# Patient Record
Sex: MALE | Race: WHITE | Employment: UNEMPLOYED | URBAN - METROPOLITAN AREA
[De-identification: names, ages, dates, MRNs, and addresses within clinical notes are randomized per-mention and may not be internally consistent; named-entity substitution may affect disease eponyms.]

---

## 2020-08-27 ENCOUNTER — APPOINTMENT (OUTPATIENT)
Dept: GENERAL RADIOLOGY | Age: 45
DRG: 812 | End: 2020-08-27
Payer: MEDICAID

## 2020-08-27 ENCOUNTER — HOSPITAL ENCOUNTER (INPATIENT)
Age: 45
LOS: 4 days | Discharge: HOME | DRG: 812 | End: 2020-08-31
Attending: EMERGENCY MEDICINE | Admitting: INTERNAL MEDICINE
Payer: MEDICAID

## 2020-08-27 PROBLEM — R09.02 HYPOXIA: Status: ACTIVE | Noted: 2020-08-27

## 2020-08-27 LAB
-: ABNORMAL
ABSOLUTE EOS #: 0.1 K/UL (ref 0–0.4)
ABSOLUTE IMMATURE GRANULOCYTE: ABNORMAL K/UL (ref 0–0.3)
ABSOLUTE LYMPH #: 1.9 K/UL (ref 1–4.8)
ABSOLUTE MONO #: 0.6 K/UL (ref 0.1–1.3)
ALBUMIN SERPL-MCNC: 4.4 G/DL (ref 3.5–5.2)
ALBUMIN/GLOBULIN RATIO: ABNORMAL (ref 1–2.5)
ALLEN TEST: ABNORMAL
ALP BLD-CCNC: 40 U/L (ref 40–129)
ALT SERPL-CCNC: 59 U/L (ref 5–41)
AMORPHOUS: ABNORMAL
ANION GAP SERPL CALCULATED.3IONS-SCNC: 13 MMOL/L (ref 9–17)
ANION GAP SERPL CALCULATED.3IONS-SCNC: 15 MMOL/L (ref 9–17)
AST SERPL-CCNC: 48 U/L
BACTERIA: ABNORMAL
BASOPHILS # BLD: 1 % (ref 0–2)
BASOPHILS ABSOLUTE: 0.1 K/UL (ref 0–0.2)
BILIRUB SERPL-MCNC: 0.21 MG/DL (ref 0.3–1.2)
BILIRUBIN URINE: NEGATIVE
BUN BLDV-MCNC: 15 MG/DL (ref 6–20)
BUN BLDV-MCNC: 16 MG/DL (ref 6–20)
BUN/CREAT BLD: ABNORMAL (ref 9–20)
BUN/CREAT BLD: ABNORMAL (ref 9–20)
CALCIUM SERPL-MCNC: 8.4 MG/DL (ref 8.6–10.4)
CALCIUM SERPL-MCNC: 8.7 MG/DL (ref 8.6–10.4)
CARBOXYHEMOGLOBIN: 4.6 % (ref 0–5)
CASTS UA: ABNORMAL /LPF
CASTS UA: ABNORMAL /LPF
CHLORIDE BLD-SCNC: 105 MMOL/L (ref 98–107)
CHLORIDE BLD-SCNC: 110 MMOL/L (ref 98–107)
CO2: 18 MMOL/L (ref 20–31)
CO2: 18 MMOL/L (ref 20–31)
COLOR: YELLOW
COMMENT UA: ABNORMAL
CREAT SERPL-MCNC: 0.82 MG/DL (ref 0.7–1.2)
CREAT SERPL-MCNC: 1.27 MG/DL (ref 0.7–1.2)
CRYSTALS, UA: ABNORMAL /HPF
DIFFERENTIAL TYPE: ABNORMAL
EOSINOPHILS RELATIVE PERCENT: 2 % (ref 0–4)
EPITHELIAL CELLS UA: ABNORMAL /HPF
FIO2: ABNORMAL
GFR AFRICAN AMERICAN: >60 ML/MIN
GFR AFRICAN AMERICAN: >60 ML/MIN
GFR NON-AFRICAN AMERICAN: >60 ML/MIN
GFR NON-AFRICAN AMERICAN: >60 ML/MIN
GFR SERPL CREATININE-BSD FRML MDRD: ABNORMAL ML/MIN/{1.73_M2}
GLUCOSE BLD-MCNC: 137 MG/DL (ref 70–99)
GLUCOSE BLD-MCNC: 89 MG/DL (ref 70–99)
GLUCOSE URINE: NEGATIVE
HCO3 ARTERIAL: 20.8 MMOL/L (ref 22–26)
HCT VFR BLD CALC: 48.6 % (ref 41–53)
HEMOGLOBIN: 16.3 G/DL (ref 13.5–17.5)
IMMATURE GRANULOCYTES: ABNORMAL %
KETONES, URINE: NEGATIVE
LEUKOCYTE ESTERASE, URINE: NEGATIVE
LYMPHOCYTES # BLD: 25 % (ref 24–44)
MAGNESIUM: 2.4 MG/DL (ref 1.6–2.6)
MCH RBC QN AUTO: 35.7 PG (ref 26–34)
MCHC RBC AUTO-ENTMCNC: 33.6 G/DL (ref 31–37)
MCV RBC AUTO: 106.1 FL (ref 80–100)
METHEMOGLOBIN: ABNORMAL % (ref 0–1.9)
MODE: ABNORMAL
MONOCYTES # BLD: 8 % (ref 1–7)
MUCUS: ABNORMAL
NEGATIVE BASE EXCESS, ART: 7.4 MMOL/L (ref 0–2)
NITRITE, URINE: NEGATIVE
NOTIFICATION TIME: ABNORMAL
NOTIFICATION: ABNORMAL
NRBC AUTOMATED: ABNORMAL PER 100 WBC
O2 DEVICE/FLOW/%: ABNORMAL
O2 SAT, ARTERIAL: 88.6 % (ref 95–98)
OTHER OBSERVATIONS UA: ABNORMAL
OXYHEMOGLOBIN: ABNORMAL % (ref 95–98)
PATIENT TEMP: 37
PCO2 ARTERIAL: 54.5 MMHG (ref 35–45)
PCO2, ART, TEMP ADJ: ABNORMAL (ref 35–45)
PDW BLD-RTO: 14.3 % (ref 11.5–14.9)
PEEP/CPAP: ABNORMAL
PH ARTERIAL: 7.19 (ref 7.35–7.45)
PH UA: 5.5 (ref 5–8)
PH, ART, TEMP ADJ: ABNORMAL (ref 7.35–7.45)
PLATELET # BLD: 298 K/UL (ref 150–450)
PLATELET ESTIMATE: ABNORMAL
PMV BLD AUTO: 7.4 FL (ref 6–12)
PO2 ARTERIAL: 73.1 MMHG (ref 80–100)
PO2, ART, TEMP ADJ: ABNORMAL MMHG (ref 80–100)
POSITIVE BASE EXCESS, ART: ABNORMAL MMOL/L (ref 0–2)
POTASSIUM SERPL-SCNC: 3.5 MMOL/L (ref 3.7–5.3)
POTASSIUM SERPL-SCNC: 4.8 MMOL/L (ref 3.7–5.3)
PROTEIN UA: ABNORMAL
PSV: ABNORMAL
PT. POSITION: ABNORMAL
RBC # BLD: 4.58 M/UL (ref 4.5–5.9)
RBC # BLD: ABNORMAL 10*6/UL
RBC UA: ABNORMAL /HPF
RENAL EPITHELIAL, UA: ABNORMAL /HPF
RESPIRATORY RATE: ABNORMAL
SAMPLE SITE: ABNORMAL
SEG NEUTROPHILS: 64 % (ref 36–66)
SEGMENTED NEUTROPHILS ABSOLUTE COUNT: 4.6 K/UL (ref 1.3–9.1)
SET RATE: ABNORMAL
SODIUM BLD-SCNC: 138 MMOL/L (ref 135–144)
SODIUM BLD-SCNC: 141 MMOL/L (ref 135–144)
SPECIFIC GRAVITY UA: 1.01 (ref 1–1.03)
TEXT FOR RESPIRATORY: ABNORMAL
TOTAL HB: ABNORMAL G/DL (ref 12–16)
TOTAL PROTEIN: 7.3 G/DL (ref 6.4–8.3)
TOTAL RATE: ABNORMAL
TRICHOMONAS: ABNORMAL
TROPONIN INTERP: ABNORMAL
TROPONIN T: ABNORMAL NG/ML
TROPONIN, HIGH SENSITIVITY: 41 NG/L (ref 0–22)
TROPONIN, HIGH SENSITIVITY: 46 NG/L (ref 0–22)
TROPONIN, HIGH SENSITIVITY: 61 NG/L (ref 0–22)
TURBIDITY: ABNORMAL
URINE HGB: NEGATIVE
UROBILINOGEN, URINE: NORMAL
VT: ABNORMAL
WBC # BLD: 7.3 K/UL (ref 3.5–11)
WBC # BLD: ABNORMAL 10*3/UL
WBC UA: ABNORMAL /HPF
YEAST: ABNORMAL

## 2020-08-27 PROCEDURE — 36600 WITHDRAWAL OF ARTERIAL BLOOD: CPT

## 2020-08-27 PROCEDURE — 82805 BLOOD GASES W/O2 SATURATION: CPT

## 2020-08-27 PROCEDURE — 99285 EMERGENCY DEPT VISIT HI MDM: CPT

## 2020-08-27 PROCEDURE — 2580000003 HC RX 258: Performed by: INTERNAL MEDICINE

## 2020-08-27 PROCEDURE — 81001 URINALYSIS AUTO W/SCOPE: CPT

## 2020-08-27 PROCEDURE — 80048 BASIC METABOLIC PNL TOTAL CA: CPT

## 2020-08-27 PROCEDURE — 93005 ELECTROCARDIOGRAM TRACING: CPT | Performed by: EMERGENCY MEDICINE

## 2020-08-27 PROCEDURE — 96376 TX/PRO/DX INJ SAME DRUG ADON: CPT

## 2020-08-27 PROCEDURE — 80053 COMPREHEN METABOLIC PANEL: CPT

## 2020-08-27 PROCEDURE — 94770 HC ETCO2 MONITOR DAILY: CPT

## 2020-08-27 PROCEDURE — 80307 DRUG TEST PRSMV CHEM ANLYZR: CPT

## 2020-08-27 PROCEDURE — 2580000003 HC RX 258: Performed by: EMERGENCY MEDICINE

## 2020-08-27 PROCEDURE — 36415 COLL VENOUS BLD VENIPUNCTURE: CPT

## 2020-08-27 PROCEDURE — 83735 ASSAY OF MAGNESIUM: CPT

## 2020-08-27 PROCEDURE — 94761 N-INVAS EAR/PLS OXIMETRY MLT: CPT

## 2020-08-27 PROCEDURE — 2700000000 HC OXYGEN THERAPY PER DAY

## 2020-08-27 PROCEDURE — 71045 X-RAY EXAM CHEST 1 VIEW: CPT

## 2020-08-27 PROCEDURE — 84484 ASSAY OF TROPONIN QUANT: CPT

## 2020-08-27 PROCEDURE — 96374 THER/PROPH/DIAG INJ IV PUSH: CPT

## 2020-08-27 PROCEDURE — 94660 CPAP INITIATION&MGMT: CPT

## 2020-08-27 PROCEDURE — 6360000002 HC RX W HCPCS: Performed by: EMERGENCY MEDICINE

## 2020-08-27 PROCEDURE — 2000000000 HC ICU R&B

## 2020-08-27 PROCEDURE — 85025 COMPLETE CBC W/AUTO DIFF WBC: CPT

## 2020-08-27 RX ORDER — LORAZEPAM 2 MG/ML
2 INJECTION INTRAMUSCULAR
Status: DISCONTINUED | OUTPATIENT
Start: 2020-08-27 | End: 2020-08-28

## 2020-08-27 RX ORDER — SODIUM CHLORIDE 0.9 % (FLUSH) 0.9 %
10 SYRINGE (ML) INJECTION PRN
Status: DISCONTINUED | OUTPATIENT
Start: 2020-08-27 | End: 2020-08-31 | Stop reason: HOSPADM

## 2020-08-27 RX ORDER — SODIUM CHLORIDE 9 MG/ML
INJECTION, SOLUTION INTRAVENOUS CONTINUOUS
Status: DISCONTINUED | OUTPATIENT
Start: 2020-08-27 | End: 2020-08-29

## 2020-08-27 RX ORDER — ACETAMINOPHEN 325 MG/1
650 TABLET ORAL EVERY 4 HOURS PRN
Status: DISCONTINUED | OUTPATIENT
Start: 2020-08-27 | End: 2020-08-31 | Stop reason: HOSPADM

## 2020-08-27 RX ORDER — LORAZEPAM 2 MG/ML
4 INJECTION INTRAMUSCULAR
Status: DISCONTINUED | OUTPATIENT
Start: 2020-08-27 | End: 2020-08-28

## 2020-08-27 RX ORDER — LORAZEPAM 1 MG/1
3 TABLET ORAL
Status: DISCONTINUED | OUTPATIENT
Start: 2020-08-27 | End: 2020-08-28

## 2020-08-27 RX ORDER — NALOXONE HYDROCHLORIDE 1 MG/ML
1 INJECTION INTRAMUSCULAR; INTRAVENOUS; SUBCUTANEOUS ONCE
Status: COMPLETED | OUTPATIENT
Start: 2020-08-27 | End: 2020-08-27

## 2020-08-27 RX ORDER — SODIUM CHLORIDE 0.9 % (FLUSH) 0.9 %
10 SYRINGE (ML) INJECTION EVERY 12 HOURS SCHEDULED
Status: DISCONTINUED | OUTPATIENT
Start: 2020-08-27 | End: 2020-08-31 | Stop reason: HOSPADM

## 2020-08-27 RX ORDER — SODIUM CHLORIDE 0.9 % (FLUSH) 0.9 %
10 SYRINGE (ML) INJECTION PRN
Status: DISCONTINUED | OUTPATIENT
Start: 2020-08-27 | End: 2020-08-28 | Stop reason: SDUPTHER

## 2020-08-27 RX ORDER — NALOXONE HYDROCHLORIDE 1 MG/ML
2 INJECTION INTRAMUSCULAR; INTRAVENOUS; SUBCUTANEOUS ONCE
Status: COMPLETED | OUTPATIENT
Start: 2020-08-27 | End: 2020-08-27

## 2020-08-27 RX ORDER — LORAZEPAM 2 MG/ML
1 INJECTION INTRAMUSCULAR
Status: DISCONTINUED | OUTPATIENT
Start: 2020-08-27 | End: 2020-08-28

## 2020-08-27 RX ORDER — LORAZEPAM 1 MG/1
2 TABLET ORAL
Status: DISCONTINUED | OUTPATIENT
Start: 2020-08-27 | End: 2020-08-28

## 2020-08-27 RX ORDER — LORAZEPAM 1 MG/1
1 TABLET ORAL
Status: DISCONTINUED | OUTPATIENT
Start: 2020-08-27 | End: 2020-08-28

## 2020-08-27 RX ORDER — LORAZEPAM 1 MG/1
4 TABLET ORAL
Status: DISCONTINUED | OUTPATIENT
Start: 2020-08-27 | End: 2020-08-28

## 2020-08-27 RX ORDER — LORAZEPAM 2 MG/ML
3 INJECTION INTRAMUSCULAR
Status: DISCONTINUED | OUTPATIENT
Start: 2020-08-27 | End: 2020-08-28

## 2020-08-27 RX ORDER — SODIUM CHLORIDE 0.9 % (FLUSH) 0.9 %
10 SYRINGE (ML) INJECTION EVERY 12 HOURS SCHEDULED
Status: DISCONTINUED | OUTPATIENT
Start: 2020-08-27 | End: 2020-08-28 | Stop reason: SDUPTHER

## 2020-08-27 RX ORDER — 0.9 % SODIUM CHLORIDE 0.9 %
1000 INTRAVENOUS SOLUTION INTRAVENOUS ONCE
Status: COMPLETED | OUTPATIENT
Start: 2020-08-27 | End: 2020-08-27

## 2020-08-27 RX ADMIN — SODIUM CHLORIDE 1000 ML: 9 INJECTION, SOLUTION INTRAVENOUS at 13:20

## 2020-08-27 RX ADMIN — SODIUM CHLORIDE: 9 INJECTION, SOLUTION INTRAVENOUS at 22:30

## 2020-08-27 RX ADMIN — NALOXONE HYDROCHLORIDE 2 MG: 1 INJECTION PARENTERAL at 13:20

## 2020-08-27 RX ADMIN — NALOXONE HYDROCHLORIDE 1 MG: 1 INJECTION PARENTERAL at 15:19

## 2020-08-27 ASSESSMENT — ENCOUNTER SYMPTOMS
BACK PAIN: 0
INGESTION: 1
COLOR CHANGE: 0
SHORTNESS OF BREATH: 0
ABDOMINAL PAIN: 0
EYE PAIN: 0

## 2020-08-27 NOTE — ED NOTES
Bed: 07A  Expected date:   Expected time:   Means of arrival:   Comments:     Radha Regalado, IVONNE  08/27/20 6569

## 2020-08-27 NOTE — ED TRIAGE NOTES
Pt presents in ED via EMS per squad the pt was found unresponsive next to an individual who was in cardiac arrest; EMS stated that the pt admitted to ingesting an unknown amount of heroin; unknown route; pt was given the following by squad:    6 mg of Narcan IV  2 mg of Narcan IN  4 mg of Zofran      Upon arrival to ED; pt is alert and oriented to self; situation and location; pt is diaphoretic; pt denies medical concerns; pt refused to admit to heroin use; pt stated that this is out of his normal character; pt is apologetic; placed on Cardiac monitor.

## 2020-08-27 NOTE — PROGRESS NOTES
Medication History completed:    Patient denies taking any prescription medications or OTC medications. Thank you,  Maya Butler PharmD.  Candidate 2021

## 2020-08-28 PROBLEM — N17.9 AKI (ACUTE KIDNEY INJURY) (HCC): Status: ACTIVE | Noted: 2020-08-28

## 2020-08-28 PROBLEM — I24.9 ACS (ACUTE CORONARY SYNDROME) (HCC): Status: ACTIVE | Noted: 2020-08-28

## 2020-08-28 PROBLEM — F19.10 SUBSTANCE ABUSE (HCC): Status: ACTIVE | Noted: 2020-08-28

## 2020-08-28 PROBLEM — I10 HYPERTENSION: Status: ACTIVE | Noted: 2020-08-28

## 2020-08-28 LAB
ABSOLUTE EOS #: 0 K/UL (ref 0–0.4)
ABSOLUTE IMMATURE GRANULOCYTE: ABNORMAL K/UL (ref 0–0.3)
ABSOLUTE LYMPH #: 1.6 K/UL (ref 1–4.8)
ABSOLUTE MONO #: 1.2 K/UL (ref 0.1–1.3)
ALLEN TEST: ABNORMAL
AMPHETAMINE SCREEN URINE: NEGATIVE
ANION GAP SERPL CALCULATED.3IONS-SCNC: 11 MMOL/L (ref 9–17)
BARBITURATE SCREEN URINE: NEGATIVE
BASOPHILS # BLD: 1 % (ref 0–2)
BASOPHILS ABSOLUTE: 0 K/UL (ref 0–0.2)
BENZODIAZEPINE SCREEN, URINE: NEGATIVE
BNP INTERPRETATION: NORMAL
BUN BLDV-MCNC: 17 MG/DL (ref 6–20)
BUN/CREAT BLD: ABNORMAL (ref 9–20)
BUPRENORPHINE URINE: NORMAL
C-REACTIVE PROTEIN: 14.9 MG/L (ref 0–5)
CALCIUM SERPL-MCNC: 8.6 MG/DL (ref 8.6–10.4)
CANNABINOID SCREEN URINE: NEGATIVE
CARBOXYHEMOGLOBIN: 1.7 % (ref 0–5)
CARBOXYHEMOGLOBIN: 2 % (ref 0–5)
CARBOXYHEMOGLOBIN: 3.1 % (ref 0–5)
CHLORIDE BLD-SCNC: 108 MMOL/L (ref 98–107)
CO2: 22 MMOL/L (ref 20–31)
COCAINE METABOLITE, URINE: NEGATIVE
CREAT SERPL-MCNC: 0.82 MG/DL (ref 0.7–1.2)
D-DIMER QUANTITATIVE: 0.38 MG/L FEU (ref 0–0.59)
DIFFERENTIAL TYPE: ABNORMAL
EKG ATRIAL RATE: 122 BPM
EKG P AXIS: 67 DEGREES
EKG P-R INTERVAL: 150 MS
EKG Q-T INTERVAL: 308 MS
EKG QRS DURATION: 96 MS
EKG QTC CALCULATION (BAZETT): 438 MS
EKG R AXIS: 31 DEGREES
EKG T AXIS: 145 DEGREES
EKG VENTRICULAR RATE: 122 BPM
EOSINOPHILS RELATIVE PERCENT: 0 % (ref 0–4)
FERRITIN: 161 UG/L (ref 30–400)
FIO2: 30
FIO2: ABNORMAL
FIO2: ABNORMAL
GFR AFRICAN AMERICAN: >60 ML/MIN
GFR NON-AFRICAN AMERICAN: >60 ML/MIN
GFR SERPL CREATININE-BSD FRML MDRD: ABNORMAL ML/MIN/{1.73_M2}
GFR SERPL CREATININE-BSD FRML MDRD: ABNORMAL ML/MIN/{1.73_M2}
GLUCOSE BLD-MCNC: 83 MG/DL (ref 70–99)
HCO3 ARTERIAL: 19.9 MMOL/L (ref 22–26)
HCO3 ARTERIAL: 21.2 MMOL/L (ref 22–26)
HCO3 ARTERIAL: 22.9 MMOL/L (ref 22–26)
HCT VFR BLD CALC: 44.8 % (ref 41–53)
HEMOGLOBIN: 15 G/DL (ref 13.5–17.5)
IMMATURE GRANULOCYTES: ABNORMAL %
LACTATE DEHYDROGENASE: 173 U/L (ref 135–225)
LV EF: 53 %
LVEF MODALITY: NORMAL
LYMPHOCYTES # BLD: 19 % (ref 24–44)
MCH RBC QN AUTO: 35.8 PG (ref 26–34)
MCHC RBC AUTO-ENTMCNC: 33.5 G/DL (ref 31–37)
MCV RBC AUTO: 106.9 FL (ref 80–100)
MDMA URINE: NORMAL
METHADONE SCREEN, URINE: NEGATIVE
METHAMPHETAMINE, URINE: NORMAL
METHEMOGLOBIN: 0.8 % (ref 0–1.9)
METHEMOGLOBIN: 0.8 % (ref 0–1.9)
METHEMOGLOBIN: 1 % (ref 0–1.9)
MODE: ABNORMAL
MONOCYTES # BLD: 15 % (ref 1–7)
MYOGLOBIN: 82 NG/ML (ref 28–72)
NEGATIVE BASE EXCESS, ART: 3.8 MMOL/L (ref 0–2)
NEGATIVE BASE EXCESS, ART: 6 MMOL/L (ref 0–2)
NEGATIVE BASE EXCESS, ART: 7.5 MMOL/L (ref 0–2)
NOTIFICATION TIME: ABNORMAL
NOTIFICATION: ABNORMAL
NRBC AUTOMATED: ABNORMAL PER 100 WBC
O2 DEVICE/FLOW/%: ABNORMAL
O2 SAT, ARTERIAL: 94 % (ref 95–98)
O2 SAT, ARTERIAL: 95 % (ref 95–98)
O2 SAT, ARTERIAL: 95.1 % (ref 95–98)
OPIATES, URINE: NEGATIVE
OXYCODONE SCREEN URINE: NEGATIVE
OXYHEMOGLOBIN: ABNORMAL % (ref 95–98)
PATIENT TEMP: 37
PCO2 ARTERIAL: 46.9 MMHG (ref 35–45)
PCO2 ARTERIAL: 48.3 MMHG (ref 35–45)
PCO2 ARTERIAL: 48.6 MMHG (ref 35–45)
PCO2, ART, TEMP ADJ: ABNORMAL (ref 35–45)
PDW BLD-RTO: 14.2 % (ref 11.5–14.9)
PEEP/CPAP: ABNORMAL
PH ARTERIAL: 7.24 (ref 7.35–7.45)
PH ARTERIAL: 7.25 (ref 7.35–7.45)
PH ARTERIAL: 7.29 (ref 7.35–7.45)
PH, ART, TEMP ADJ: ABNORMAL (ref 7.35–7.45)
PHENCYCLIDINE, URINE: NEGATIVE
PLATELET # BLD: 251 K/UL (ref 150–450)
PLATELET ESTIMATE: ABNORMAL
PMV BLD AUTO: 7.5 FL (ref 6–12)
PO2 ARTERIAL: 108 MMHG (ref 80–100)
PO2 ARTERIAL: 93.4 MMHG (ref 80–100)
PO2 ARTERIAL: 99 MMHG (ref 80–100)
PO2, ART, TEMP ADJ: ABNORMAL MMHG (ref 80–100)
POSITIVE BASE EXCESS, ART: ABNORMAL MMOL/L (ref 0–2)
POTASSIUM SERPL-SCNC: 4.8 MMOL/L (ref 3.7–5.3)
PRO-BNP: 56 PG/ML
PROPOXYPHENE, URINE: NORMAL
PSV: ABNORMAL
PT. POSITION: ABNORMAL
RBC # BLD: 4.19 M/UL (ref 4.5–5.9)
RBC # BLD: ABNORMAL 10*6/UL
RESPIRATORY RATE: 22
RESPIRATORY RATE: 23
RESPIRATORY RATE: 25
SAMPLE SITE: ABNORMAL
SEG NEUTROPHILS: 65 % (ref 36–66)
SEGMENTED NEUTROPHILS ABSOLUTE COUNT: 5.3 K/UL (ref 1.3–9.1)
SET RATE: ABNORMAL
SODIUM BLD-SCNC: 141 MMOL/L (ref 135–144)
TEST INFORMATION: NORMAL
TEXT FOR RESPIRATORY: ABNORMAL
TOTAL HB: ABNORMAL G/DL (ref 12–16)
TOTAL RATE: ABNORMAL
TRICYCLIC ANTIDEPRESSANTS, UR: NORMAL
TROPONIN INTERP: ABNORMAL
TROPONIN T: ABNORMAL NG/ML
TROPONIN, HIGH SENSITIVITY: 73 NG/L (ref 0–22)
VT: ABNORMAL
WBC # BLD: 8.1 K/UL (ref 3.5–11)
WBC # BLD: ABNORMAL 10*3/UL

## 2020-08-28 PROCEDURE — 82805 BLOOD GASES W/O2 SATURATION: CPT

## 2020-08-28 PROCEDURE — C8929 TTE W OR WO FOL WCON,DOPPLER: HCPCS

## 2020-08-28 PROCEDURE — 80048 BASIC METABOLIC PNL TOTAL CA: CPT

## 2020-08-28 PROCEDURE — 83615 LACTATE (LD) (LDH) ENZYME: CPT

## 2020-08-28 PROCEDURE — 82728 ASSAY OF FERRITIN: CPT

## 2020-08-28 PROCEDURE — 94761 N-INVAS EAR/PLS OXIMETRY MLT: CPT

## 2020-08-28 PROCEDURE — 6360000002 HC RX W HCPCS: Performed by: INTERNAL MEDICINE

## 2020-08-28 PROCEDURE — 85025 COMPLETE CBC W/AUTO DIFF WBC: CPT

## 2020-08-28 PROCEDURE — 2500000003 HC RX 250 WO HCPCS: Performed by: STUDENT IN AN ORGANIZED HEALTH CARE EDUCATION/TRAINING PROGRAM

## 2020-08-28 PROCEDURE — 6370000000 HC RX 637 (ALT 250 FOR IP): Performed by: INTERNAL MEDICINE

## 2020-08-28 PROCEDURE — 2700000000 HC OXYGEN THERAPY PER DAY

## 2020-08-28 PROCEDURE — 84484 ASSAY OF TROPONIN QUANT: CPT

## 2020-08-28 PROCEDURE — 85379 FIBRIN DEGRADATION QUANT: CPT

## 2020-08-28 PROCEDURE — 83874 ASSAY OF MYOGLOBIN: CPT

## 2020-08-28 PROCEDURE — 94640 AIRWAY INHALATION TREATMENT: CPT

## 2020-08-28 PROCEDURE — 6360000004 HC RX CONTRAST MEDICATION: Performed by: INTERNAL MEDICINE

## 2020-08-28 PROCEDURE — 36415 COLL VENOUS BLD VENIPUNCTURE: CPT

## 2020-08-28 PROCEDURE — 83880 ASSAY OF NATRIURETIC PEPTIDE: CPT

## 2020-08-28 PROCEDURE — 86140 C-REACTIVE PROTEIN: CPT

## 2020-08-28 PROCEDURE — 2000000000 HC ICU R&B

## 2020-08-28 PROCEDURE — 99291 CRITICAL CARE FIRST HOUR: CPT | Performed by: INTERNAL MEDICINE

## 2020-08-28 PROCEDURE — 2580000003 HC RX 258: Performed by: INTERNAL MEDICINE

## 2020-08-28 PROCEDURE — 6360000002 HC RX W HCPCS: Performed by: STUDENT IN AN ORGANIZED HEALTH CARE EDUCATION/TRAINING PROGRAM

## 2020-08-28 PROCEDURE — 6370000000 HC RX 637 (ALT 250 FOR IP): Performed by: STUDENT IN AN ORGANIZED HEALTH CARE EDUCATION/TRAINING PROGRAM

## 2020-08-28 PROCEDURE — 94660 CPAP INITIATION&MGMT: CPT

## 2020-08-28 PROCEDURE — 36600 WITHDRAWAL OF ARTERIAL BLOOD: CPT

## 2020-08-28 RX ORDER — ASPIRIN 81 MG/1
81 TABLET, CHEWABLE ORAL DAILY
Status: DISCONTINUED | OUTPATIENT
Start: 2020-08-28 | End: 2020-08-31 | Stop reason: HOSPADM

## 2020-08-28 RX ORDER — CARVEDILOL 6.25 MG/1
6.25 TABLET ORAL 2 TIMES DAILY WITH MEALS
Status: DISCONTINUED | OUTPATIENT
Start: 2020-08-28 | End: 2020-08-31 | Stop reason: HOSPADM

## 2020-08-28 RX ORDER — IPRATROPIUM BROMIDE AND ALBUTEROL SULFATE 2.5; .5 MG/3ML; MG/3ML
1 SOLUTION RESPIRATORY (INHALATION)
Status: DISCONTINUED | OUTPATIENT
Start: 2020-08-28 | End: 2020-08-29

## 2020-08-28 RX ORDER — METOPROLOL TARTRATE 5 MG/5ML
5 INJECTION INTRAVENOUS EVERY 6 HOURS PRN
Status: DISCONTINUED | OUTPATIENT
Start: 2020-08-28 | End: 2020-08-31 | Stop reason: HOSPADM

## 2020-08-28 RX ADMIN — CARVEDILOL 6.25 MG: 6.25 TABLET, FILM COATED ORAL at 11:35

## 2020-08-28 RX ADMIN — LORAZEPAM 1 MG: 2 INJECTION INTRAMUSCULAR; INTRAVENOUS at 01:30

## 2020-08-28 RX ADMIN — ASPIRIN 81 MG 81 MG: 81 TABLET ORAL at 11:45

## 2020-08-28 RX ADMIN — PERFLUTREN 2.2 MG: 6.52 INJECTION, SUSPENSION INTRAVENOUS at 11:09

## 2020-08-28 RX ADMIN — IPRATROPIUM BROMIDE AND ALBUTEROL SULFATE 1 AMPULE: .5; 3 SOLUTION RESPIRATORY (INHALATION) at 11:31

## 2020-08-28 RX ADMIN — IPRATROPIUM BROMIDE AND ALBUTEROL SULFATE 1 AMPULE: .5; 3 SOLUTION RESPIRATORY (INHALATION) at 18:54

## 2020-08-28 RX ADMIN — METOPROLOL TARTRATE 5 MG: 1 INJECTION, SOLUTION INTRAVENOUS at 13:14

## 2020-08-28 RX ADMIN — IPRATROPIUM BROMIDE AND ALBUTEROL SULFATE 1 AMPULE: .5; 3 SOLUTION RESPIRATORY (INHALATION) at 15:06

## 2020-08-28 RX ADMIN — ENOXAPARIN SODIUM 40 MG: 40 INJECTION SUBCUTANEOUS at 11:35

## 2020-08-28 RX ADMIN — SODIUM CHLORIDE: 9 INJECTION, SOLUTION INTRAVENOUS at 16:42

## 2020-08-28 RX ADMIN — LORAZEPAM 1 MG: 2 INJECTION INTRAMUSCULAR; INTRAVENOUS at 06:34

## 2020-08-28 RX ADMIN — ENOXAPARIN SODIUM 40 MG: 40 INJECTION SUBCUTANEOUS at 09:27

## 2020-08-28 RX ADMIN — ENOXAPARIN SODIUM 80 MG: 100 INJECTION SUBCUTANEOUS at 23:50

## 2020-08-28 RX ADMIN — CARVEDILOL 6.25 MG: 6.25 TABLET, FILM COATED ORAL at 16:09

## 2020-08-28 RX ADMIN — IPRATROPIUM BROMIDE AND ALBUTEROL SULFATE 1 AMPULE: .5; 3 SOLUTION RESPIRATORY (INHALATION) at 23:14

## 2020-08-28 ASSESSMENT — ENCOUNTER SYMPTOMS
SHORTNESS OF BREATH: 1
RESPIRATORY NEGATIVE: 1
DIARRHEA: 0
COUGH: 0
NAUSEA: 0
VOMITING: 0
GASTROINTESTINAL NEGATIVE: 1
EYES NEGATIVE: 1
ABDOMINAL PAIN: 0
WHEEZING: 0

## 2020-08-28 ASSESSMENT — PAIN SCALES - GENERAL
PAINLEVEL_OUTOF10: 0
PAINLEVEL_OUTOF10: 0

## 2020-08-28 NOTE — PLAN OF CARE
Problem: Falls - Risk of:  Goal: Will remain free from falls  Description: Will remain free from falls  8/28/2020 1612 by Thelma Simmonds, RN  Outcome: Ongoing  Note: Bed remains in lowest position, call light within reach. Patient remains free of falls at this time. RN will continue to monitor. 8/28/2020 0350 by Vishal Peraza RN  Outcome: Ongoing  Goal: Absence of physical injury  Description: Absence of physical injury  8/28/2020 1612 by Thelma Simmonds, RN  Outcome: Ongoing  8/28/2020 0350 by Vishal Peraza RN  Outcome: Ongoing     Problem: Breathing Pattern - Ineffective:  Goal: Ability to achieve and maintain a regular respiratory rate will improve  Description: Ability to achieve and maintain a regular respiratory rate will improve  8/28/2020 1612 by Thelma Simmonds, RN  Outcome: Ongoing  Note: Pt remains on bipap  8/28/2020 0350 by Vishal Peraza RN  Outcome: Ongoing  Note: Pt remains on continuous BiPAP to aid in resolution of acidosis. Tolerating well. Problem: Safety:  Goal: Free from accidental physical injury  Description: Free from accidental physical injury  8/28/2020 1612 by Thelma Simmonds, RN  Outcome: Ongoing  Note: Bed remains in lowest position, call light within reach. Patient remains free of falls at this time. RN will continue to monitor. 8/28/2020 0350 by Vishal Peraza RN  Outcome: Ongoing  Goal: Free from intentional harm  Description: Free from intentional harm  8/28/2020 1612 by Thelma Simmonds, RN  Outcome: Ongoing  8/28/2020 0350 by Vishal Peraza RN  Outcome: Ongoing     Problem: Daily Care:  Goal: Daily care needs are met  Description: Daily care needs are met  8/28/2020 1612 by Thelma Simmonds, RN  Outcome: Ongoing  Note: Daily care needs have been met this shift. Bathroom needs have been assessed every 2 hours. Hygiene care needs has been assessed this shift. Will continue to monitor.    8/28/2020 0350 by Vishal Peraza RN  Outcome: Ongoing     Problem: Pain:  Goal: Patient's pain/discomfort is manageable  Description: Patient's pain/discomfort is manageable  8/28/2020 1612 by Belia Gamble RN  Outcome: Ongoing  Note: Pain assessed at regular intervals. Medications administered as requested for comfort. Pain remains at a tolerable level. 8/28/2020 0350 by Marizol Coyne RN  Outcome: Ongoing     Problem: Discharge Planning:  Goal: Patients continuum of care needs are met  Description: Patients continuum of care needs are met  8/28/2020 1612 by Belia Gamble RN  Outcome: Ongoing  8/28/2020 0350 by Marizol Coyne RN  Outcome: Ongoing     Problem: Gas Exchange - Impaired:  Goal: Levels of oxygenation will improve  Description: Levels of oxygenation will improve  8/28/2020 1612 by Belia Gamble RN  Outcome: Ongoing  8/28/2020 0350 by Marizol Coyne RN  Outcome: Ongoing  Note: Pts O2 sats have been in the 90s this shift with continous BiPAP and pt has been more alert. Will continue to monitor. Problem: Skin Integrity:  Goal: Will show no infection signs and symptoms  Description: Will show no infection signs and symptoms  Outcome: Ongoing  Note: Patient turned and repositioned every 2 hours and as needed for comfort. Skin remains dry and intact. No new skin breakdown noted.    Goal: Absence of new skin breakdown  Description: Absence of new skin breakdown  Outcome: Ongoing

## 2020-08-28 NOTE — FLOWSHEET NOTE
08/27/20 2229   Provider Notification   Reason for Communication Review case   Provider Name Dr. Mars Perez   Provider Notification Physician   Method of Communication Call   Response See orders   Notification Time 65   Dr. Mars Perez called RN asking about an update. After RN updated doctor she ordered a stat BMP and to update her with the results. Orders initiated.

## 2020-08-28 NOTE — PROGRESS NOTES
Patient transferred back to ICU 2001 report given to Jennifer CORONADO at bedside, RN updated to call  for other gases after bipap  Electronically signed by Steve Templeton RN on 8/27/2020 at 9:56 PM

## 2020-08-28 NOTE — FLOWSHEET NOTE
08/27/20 2300   Provider Notification   Reason for Communication Evaluate   Provider Name Dr. Abimael Garsia   Provider Notification Physician   Method of Communication Call   Response No new orders   Notification Time [phone]   RN updated Dr. Abimael Garsia on [facility] results. No new orders at this time.

## 2020-08-28 NOTE — PROGRESS NOTES
Per Dr. Shannan Castellanos once the urine drug tox has been obtained. Please contact Poison control to see what further management is required.  Electronically signed by Allison Chaudhary RN on 8/27/2020 at 10:58 PM

## 2020-08-28 NOTE — PROGRESS NOTES
Pt transferred to ICU 2001 from PCU. Heart monitor applied, pulse ox applied, and pt assessed. Respiratory notified of BiPAP order, currently being placed on pt. RN will call Hugo Starr for any repeat labs. Pt having no c/o or s/s of distress at this time, will continue to monitor.

## 2020-08-28 NOTE — CONSULTS
[facility]  Consult             Date:   2020  Patient name: Kamilla Wheeler  Date of admission:  2020 12:44 PM  MRN:   [id number]  YOB: 1975    Reason for Consult:  Elevated troponin    CHIEF COMPLAINT:  Drug overdose     History Obtained From:  Patient, EMR    HISTORY OF PRESENT ILLNESS:      Brief History:  Patient was brought to the ED secondary to drug overdose. He was found unconscious, became arousable after Narcan administration. Patient had used a white substance prior, claims it may have been heroin but was not certain. Patient states he got this substance from a neighbor, does not know what it is. Urine drug screen was negative for all substances. Patient was in respiratory failure in the ED, was started on BiPAP. Patient had elevated troponin, history of hypertension, not on medication. Patient drinks 4-5 beers daily. Chest pain: No        Past Medical History:   has a past medical history of Overdose. Past Surgical History:   has no past surgical history on file. Family History:   Positive for early CAD, father  of MI at 54    Home Medications:    Prior to Admission medications    Not on File       Allergies:  Patient has no known allergies. Social History:      1PPD tobacco  Heroin, cocaine use in the past  4-5 beers daily    REVIEW OF SYSTEMS:      Review of Systems   Constitutional: Negative for chills and fever. Respiratory: Positive for shortness of breath. Negative for cough and wheezing. Cardiovascular: Negative for chest pain, palpitations and leg swelling. Gastrointestinal: Negative for abdominal pain, diarrhea, nausea and vomiting. Neurological: Negative for dizziness and headaches.          PHYSICAL EXAM:    Physical Examination:    BP (!) 151/104   Pulse 89   Temp 97.5 °F (36.4 °C) (Axillary)   Resp 18   Ht 5' 6\" (1.676 m)   Wt 185 lb (83.9 kg)   SpO2 94%   BMI 29.86 kg/m²      Physical Exam  Vitals signs and nursing note reviewed. Constitutional:       Appearance: Normal appearance. Comments: Patient on BiPAP, breathing comfortably   HENT:      Head: Normocephalic and atraumatic. Eyes:      Extraocular Movements: Extraocular movements intact. Conjunctiva/sclera: Conjunctivae normal.   Cardiovascular:      Rate and Rhythm: Normal rate and regular rhythm. Pulses: Normal pulses. Heart sounds: Normal heart sounds. No murmur. No friction rub. No gallop. Pulmonary:      Effort: Pulmonary effort is normal. No respiratory distress. Breath sounds: Rhonchi present. No wheezing or rales. Abdominal:      General: Bowel sounds are normal. There is no distension. Palpations: Abdomen is soft. Tenderness: There is no abdominal tenderness. There is no guarding. Musculoskeletal:      Right lower leg: No edema. Left lower leg: No edema. Neurological:      General: No focal deficit present. Mental Status: He is alert. DATA:    Diagnostics:      EKG: Sinus tachycardia    Labs:     CBC:   Recent Labs     08/27/20  1315 08/28/20  0334   WBC 7.3 8.1   HGB 16.3 15.0   HCT 48.6 44.8    251     BMP:   Recent Labs     08/27/20  2240 08/28/20  0334    141   K 4.8 4.8   CO2 18* 22   BUN 16 17   CREATININE 0.82 0.82   LABGLOM >60 >60   GLUCOSE 89 83     BNP: No results for input(s): BNP in the last 72 hours. PT/INR: No results for input(s): PROTIME, INR in the last 72 hours. APTT:No results for input(s): APTT in the last 72 hours. CARDIAC ENZYMES:No results for input(s): CKTOTAL, CKMB, CKMBINDEX, TROPONINI in the last 72 hours. FASTING LIPID PANEL:No results found for: HDL, LDLDIRECT, LDLCALC, TRIG  LIVER PROFILE:  Recent Labs     08/27/20  1315   AST 48*   ALT 59*   LABALBU 4.4         IMPRESSION:    1. Possible NSTEMI  2. Hypertension  3. Sinus tachycardia  4. Acute respiratory failure  5. Drug overdose  6.  Alcoholism    Patient Active Problem List   Diagnosis    Hypoxia       RECOMMENDATIONS:  1. Echo  2. Start Coreg 6.25 mg twice daily  3. ASA 81mg daily  4. Lopressor 5 mg PRN for SBP >160 or DBP > 100   5. Cardiac cath Monday morning   6. n.p.o. Monday midnight  7. Full dose Lovenox 1 mg/kg  8. Patient was given prophylactic dose of Lovenox 40 mg this morning, will give an additional 40 mg one-time today    Discussed with patient and nursing. Discussed with Dr. Leydi Lopez on rounds. Electronically signed by Elkin Winters MD on 8/28/2020 at 9:12 AM.  PGY2 Family Medicine Resident\    I performed a history and physical examination of the patient and discussed management with the resident. I reviewed the residents note and agree with the documented findings and plan of care. Any areas of disagreement are noted on the chart. I was personally present for the key portions of any procedures. I have documented in the chart those procedures where I was not present during the key portions. I have personally evaluated this patient and have completed at least one if not all key elements of the E/M (history, physical exam, and MDM). Additional findings are as noted. Rising trop worrisome for NSTEMI. No ECG changes. On BIPAP. Utox negative, ?synthetic drug use. Chronic smoker and also h/o HTN not taking meds. Also significant alcohol use. ASA, full dose lovenox. Coreg. Alcohol withdrawal precautions. Per pulmonary, wait on cath due to pulmonary status. Plan for cath on Monday. Obtain TTE. [facility] cardiology Consult  .

## 2020-08-28 NOTE — CARE COORDINATION
CASE MANAGEMENT NOTE:    Admission Date:  8/27/2020 Yamini Addison is a 54 y.o.  male    Admitted for : Hypoxia [R09.02]    Writer reviewed chart. Unable to speak with patient as he is on bipap continuously. PCP:  None listed. Insurance:  Pending Medicaid      Current Residence/ Living Arrangements:  independently at home             Current Services PTA:  None listed    Is patient agreeable to VNS: unsure, will have to speak with patient    Freedom of choice provided:  NA    List of [address] provided: NA    VNS chosen:  NA    DME:  none    Home Oxygen: No    Nebulizer: No    CPAP/BIPAP: No    Supplier: N/A    Potential Assistance Needed: to be determined. SNF needed: No    Freedom of choice and list provided: NA    Pharmacy:  None listed       Does Patient want to use MEDS to BEDS? Will have to ask patient       Discharge Plan: To be determined. Pt having cardiac cath on Monday. Drug overdose by snorting heroin.                  Electronically signed by: Patricia Lugo RN on 8/28/2020 at 11:20 AM

## 2020-08-28 NOTE — FLOWSHEET NOTE
08/27/20 2221   Provider Notification   Reason for Communication Evaluate   Provider Name Dr. Max Mcdowell   Provider Notification Physician   Method of Communication Call   Response See orders   Notification Time [phone]   RN called Dr. Max Mcdowell updating him on pt condition. He ordered repeat ABGs in 1 hour. Orders initiated.

## 2020-08-28 NOTE — FLOWSHEET NOTE
08/28/20 0554   Provider Notification   Reason for Communication Critical Value (comment)  (abg)   Provider Name Dr. Alejandrina Giordano   Provider Notification Physician   Method of Communication Call   Response No new orders   Notification Time [phone]   RN paged Dr. Alejandrina Giordano regarding latest ABG results. No new orders at this time.

## 2020-08-28 NOTE — PROGRESS NOTES
RN spoke with  regarding new consult, RN informed physician of pts ABGs and pts period of apnea breahting then gasping,he stated that pt did not need to be intubated at this time because he was alert and to place pt on bipap settings 16/10 and backup rate of 20, RN related this info to respiratory therapist   Electronically signed by Fatemeh Donaldson RN on 8/27/2020 at 9:32 PM

## 2020-08-28 NOTE — FLOWSHEET NOTE
08/27/20 2126   Provider Notification   Reason for Communication Review case   Provider Name Dr. Dulce Maria Rushing   Provider Notification Physician   Method of Communication Call   Response See orders   Notification Time 2126   RN called Dr. Dulce Maria Rushing back about abnormal ABG results. With pH being 7.19, Dr. Dulce Maria Rushing ordered transfer to ICU and intubation by Dr. Meghann Bonds.

## 2020-08-28 NOTE — CONSULTS
Pulmonary New Consult Note   [facility]    Patient - Scout Mukherjee   MRN -  [id number]   Acct # - [de-identified]   - 1975      Date of Admission -  2020 12:44 PM  Date of evaluation -  2020  Room -        ASSESSMENT     Patient Active Problem List   Diagnosis    Hypoxia         RECOMMENDATIONS   -Continue on CIWA protocol  -Trend ABGs  -Continue BiPAP, wean as tolerated       CONSULT DETAILS     Reason for Consult     Drug overdose, need for Marlyn Ro MD    Primary Care Physician - No primary care provider on file. HPI   Scout Mukherjee is a 54 y.o. male who presented to the ED on  with drug overdose. Patient was reportedly found on his porch unconscious, given 2 mg Narcan IN and 6 mg IV. Patient was somewhat arousable after Narcan, stated he had used a 'white' substance that could have been Heroin, but is not too sure. Patient was placed on BiPAP in ED due to a pH of 7.1 on ABGs. UDS was negative, CXR showed atelectasis vs fibrosis with mild cardiomegaly. Patient did require another 2 mg Narcan in ED. Decision was made to admit patient to Medical ICU for drug overdose. Today on examination, patient is resting comfortably on BiPAP. Upon further questioning, he states he feels better today than when he came in. He denies any chest pain, history of heart attacks or stent placement. States he smokes 1 PPD since he was 12. Also states he uses inhalers at home for asthma, and that he has been told he has COPD but has not seen a lung doctor. States he has used cocaine in the past, and drinks 4-5 beers a day. PMHx   Past Medical History      Diagnosis Date    Overdose       Past Surgical History    History reviewed. No pertinent surgical history.     MEDS   Current Medications    sodium chloride flush  10 mL Intravenous 2 times per day    enoxaparin  40 mg Subcutaneous Daily    sodium chloride flush  10 mL Intravenous 2 times per day     sodium chloride flush, acetaminophen, sodium chloride flush, LORazepam **OR** LORazepam **OR** LORazepam **OR** LORazepam **OR** LORazepam **OR** LORazepam **OR** LORazepam **OR** LORazepam  IV Drips/Infusions   sodium chloride 100 mL/hr at 20 2230     Home Medications  No medications prior to admission. DIET   Diet NPO Effective Now    ALLERGIES   Patient has no known allergies. SOCIAL HISTORY   Tobacco History  Social History     Tobacco Use   Smoking Status Unknown If Ever Smoked     Alcohol History  Social History     Substance and Sexual Activity   Alcohol Use None       FAMILY HISTORY   History reviewed. No pertinent family history. SLEEP HISTORY   n/a    ROS     General Denies any fever or chills  HEENT Denies any diplopia, tinnitus or vertigo  Resp negative  Denies any shortness of breath, cough or wheezing  Cardiac Denies any chest pain, palpitations, claudication or edema  GI Denies any melena, hematochezia, hematemesis or pyrosis   Denies any frequency, urgency, hesitancy or incontinence  Heme Denies bruising or bleeding easily  Endocrine Denies any history of diabetes or thyroid disease  Neuro Denies any focal motor or sensory deficits    VITALS    height is 5' 6\" (1.676 m) and weight is 185 lb (83.9 kg). His axillary temperature is 97.5 °F (36.4 °C). His blood pressure is 151/104 (abnormal) and his pulse is 89. His respiration is 17 and oxygen saturation is 95%.        Temperature Range: Temp: 97.5 °F (36.4 °C) Temp  Av.2 °F (36.8 °C)  Min: 97.5 °F (36.4 °C)  Max: 98.8 °F (37.1 °C)  BP Range:  Systolic (62SNX), AIA:313 , Min:86 , YUF:292     Diastolic (28ZGU), DGO:32, Min:58, Max:110    Pulse Range: Pulse  Av.8  Min: 85  Max: 125  Respiration Range: Resp  Av.1  Min: 8  Max: 28  Current Pulse Ox[de-identified]  SpO2: 95 %  24HR Pulse Ox Range:  SpO2  Av.4 %  Min: 88 %  Max: 97 %  Oxygen Amount and Delivery: O2 Flow Rate (L/min): 2 L/min    Wt Readings from Last 3 Encounters:   08/28/20 185 lb (83.9 kg)       I/O (24 Hours)    Intake/Output Summary (Last 24 hours) at 8/28/2020 0850  Last data filed at 8/28/2020 0357  Gross per 24 hour   Intake 657 ml   Output --   Net 657 ml       EXAM     General Appearance  Awake, alert, oriented, in no acute distress  HEENT - Normal, Head is normocephalic, atraumatic. EOMI, PERRLA  Neck - Supple, symmetrical, trachea midline and Soft, trachea midline and straight  Lungs - positive findings: rhonchi bibasilar  Cardiovascular - Heart sounds are normal.  Regular rhythm normal rate without murmur, gallop or rub. Abdomen - Soft, nontender, nondistended, no masses or organomegaly  Neurologic - CN II-XII are grossly intact.  There are no focal motor or sensory deficits  Skin - No bruising or bleeding  Extremities - No cyanosis, clubbing or edema    DATA   Old records and notes have been reviewed in [facility] EULA    CBC     Lab Results   Component Value Date    WBC 8.1 08/28/2020    RBC 4.19 08/28/2020    HGB 15.0 08/28/2020    HCT 44.8 08/28/2020     08/28/2020    .9 08/28/2020    MCH 35.8 08/28/2020    MCHC 33.5 08/28/2020    RDW 14.2 08/28/2020    LYMPHOPCT 19 08/28/2020    MONOPCT 15 08/28/2020    BASOPCT 1 08/28/2020    MONOSABS 1.20 08/28/2020    LYMPHSABS 1.60 08/28/2020    EOSABS 0.00 08/28/2020    BASOSABS 0.00 08/28/2020    DIFFTYPE NOT REPORTED 08/28/2020     BMP   Lab Results   Component Value Date     08/28/2020    K 4.8 08/28/2020     08/28/2020    CO2 22 08/28/2020    BUN 17 08/28/2020    CREATININE 0.82 08/28/2020    GLUCOSE 83 08/28/2020    CALCIUM 8.6 08/28/2020    MG 2.4 08/27/2020     LFTS  Lab Results   Component Value Date    ALKPHOS 40 08/27/2020    ALT 59 08/27/2020    AST 48 08/27/2020    PROT 7.3 08/27/2020    BILITOT 0.21 08/27/2020    LABALBU 4.4 08/27/2020     ABG   No results found for: PH, PCO2, PO2, HCO3, O2SAT  Lab Results   Component Value Date    MODE IPAP 18, EPAP 10, We will continue his BiPAP. He can take breaks for his medications. Need to watch him closely for alcohol withdrawal.  He says he drinks 4-5 shots \"every several days\". No alcohol screen was done on this admission. I would not use CIWA scale on him due to hypercapnia. If needed and he becomes agitated, will use Precedex with the short acting. Discussed with cardiology, given elevated troponins and significant history/risk factors, he will need a cardiac catheterization. Hopefully this can be done on Monday if he is stable. He will need a COVID testing preoperatively for the cardiac catheterization which we will go ahead and order today. My suspicion for him having COVID pneumonia is low, we will order some inflammatory markers if they are elevated then we will place him in droplet plus isolation until his COVID testing is back    We will order a follow-up echo. Defer blood pressure management to cardiology.   Follow-up chest x-ray tomorrow    We will recheck ABG at  1pm, until then he needs to be on BiPAP    Discussed with cardiology and primary service as well as nursing service    CCT >35 min  Electronically signed by Jamshid Christensen MD on 8/28/2020 at 10:21 AM

## 2020-08-28 NOTE — FLOWSHEET NOTE
08/27/20 2058   Provider Notification   Reason for Communication Review case   Provider Name Dr. Jose Arevalo   Provider Notification Physician   Method of Communication Call   Response See orders   Notification Time 2058   Dr. Jose Arevalo called RN back to ask again about patient abnormal breathing. ETCO2 ordered and ABG.

## 2020-08-28 NOTE — PROGRESS NOTES
Attending Physician Statement  I have discussed the care of Valorie Osei with the resident team. I have examined the patient myself and taken ros and hpi , including pertinent history and exam findings,  with the resident. I have reviewed the key elements of all parts of the encounter with the resident. I agree with the assessment, plan and orders as documented by the resident. Principal Problem:    Acute respiratory failure with hypoxia and hypercapnia (HCC)  Active Problems:    Hypoxia    Overdose    Acute respiratory acidosis    NSTEMI (non-ST elevated myocardial infarction) ([facility] [address]  Resolved Problems:    * No resolved hospital problems. *      78-year-old admitted with history of overdose, unknown substance, snorted per patient. Suspected opioid due to pinpoint pupils. Responded to Narcan, however developed acute hypoxic hypercapnic respiratory failure with significant Respiratory acidosis. Treated with BiPAP-improving, continues to be acidotic  Suspect underlying COPD due to smoking history-bronchodilators added  Suspected NSTEMI, possible secondary to cocaine use-uptrending troponins-cardiology consulted, patient for cath, once stable respiratory ochoa. Echo ordered. Full dose anticoagulation meanwhile. Full note to follow. Patient continues to be unstable and has risk of sudden deterioration requiring highest level of care. Patient seen in ICU. Critical care time spent 35 minutes.     Electronically signed by Romina Mari MD

## 2020-08-28 NOTE — PROGRESS NOTES
ABG results given to RN. pH 7.237, CO2 46.9, PO2 108, HCO3 19.9, BE -7.5, SaO2 94% while on BiPAP 16/10, 30% O2 and spontaneous RR 23bpm. Back up RR remains at 20bpm. Pt tolerated well. Pt reporting significant thirst and continues to c/o mouth dryness.

## 2020-08-28 NOTE — PLAN OF CARE
Pt remained free from injury and falls this shift. Call light within reach, non-slip socks on, bedside table within reach, 2/4 side rails up, and bed in lowest position. Will continue to monitor. Problem: Falls - Risk of:  Goal: Will remain free from falls  Description: Will remain free from falls  Outcome: Ongoing  Goal: Absence of physical injury  Description: Absence of physical injury  Outcome: Ongoing     Problem: Breathing Pattern - Ineffective:  Goal: Ability to achieve and maintain a regular respiratory rate will improve  Description: Ability to achieve and maintain a regular respiratory rate will improve  Outcome: Ongoing  Note: Pt remains on continuous BiPAP to aid in resolution of acidosis. Tolerating well. Problem: Safety:  Goal: Free from accidental physical injury  Description: Free from accidental physical injury  Outcome: Ongoing  Goal: Free from intentional harm  Description: Free from intentional harm  Outcome: Ongoing     Problem: Daily Care:  Goal: Daily care needs are met  Description: Daily care needs are met  Outcome: Ongoing     Problem: Pain:  Goal: Patient's pain/discomfort is manageable  Description: Patient's pain/discomfort is manageable  Outcome: Ongoing     Problem: Discharge Planning:  Goal: Patients continuum of care needs are met  Description: Patients continuum of care needs are met  Outcome: Ongoing     Problem: Gas Exchange - Impaired:  Goal: Levels of oxygenation will improve  Description: Levels of oxygenation will improve  Outcome: Ongoing  Note: Pts O2 sats have been in the 90s this shift with continous BiPAP and pt has been more alert. Will continue to monitor.

## 2020-08-28 NOTE — PROGRESS NOTES
RN notified writer that the pt arrived to the ICU. Pt placed on bipap set to 16/10, Back up RR of 20, 30% O2. Pt volumes fluctuating between <200mL->600mL. Pt awake and tolerating machine at this time. Pt reports he really would like ice chips. Pt maintaining mid 90s for SpO2. RR maintaining in the low 20s. Per Dr Alycia Cristina repeat ABG in 1 hour.

## 2020-08-28 NOTE — PROGRESS NOTES
Pt is sleeping on bipap set to 18/10, back up RR of 20, and 30% O2. Pt continues to return variable volumes of <200mL-<600mL. SpO2 remains in the 90s.

## 2020-08-28 NOTE — H&P
[address]      [address] [facility]     HISTORY AND PHYSICAL EXAMINATION            Date:   8/28/2020  Patient name:  Kerrie Ackerman  Date of admission:  8/27/2020 12:44 PM  MRN:   [id number]  Account:  [de-identified]  YOB: 1975  PCP:    No primary care provider on file. Room:   2001/2001-01  Code Status:    Full Code    Chief Complaint:     Chief Complaint   Patient presents with    Drug Overdose       History Obtained From:     patient    History of Present Illness: The patient is a 54 y.o. Non-/non  male who presents withDrug Overdose   and he is admitted to the hospital for the management of hypoxia. 54years old male patient admitted yesterday after when he was found unresponsive on the front porch of his house with pinpoint pupils. He was given 2 mg Narcan intranasally followed by 6 mg Narcan intravenously after which the patient became arousable with EMS, so he arrived to the ER awake and oriented to self and place. In the emergency department, he was on Nasal canula oxygen and his course over there was remarkable for continual unresponsiveness several times for which he was given IV Narcan again (2mg) and his oxygen saturations dropped to 80%. He was observed in the emergency room for prolonged time and he was admitted as a case of hypoxia for further management. Upon further questioning and history taking. The patient does not recall any details that happened yesterday. He mentioned that he sniffed a substance that he thought that was heroin but he was not sure. He used \"marijuana before back in his days\". Reviewing of medical records showed no past medical or surgical history. When asked about smoking, patient said he smoked since the age of 12 and utilizes inhalers occasionally.   Patient also reported that he was diagnosed previously with hypertension for which he is not compliant with his blood pressure medication. There was no chest pain no palpitations, no lower limb swelling. Past Medical History:     Past Medical History:   Diagnosis Date    Overdose         Past SurgicalHistory:     History reviewed. No pertinent surgical history. Medications Prior to Admission:        Prior to Admission medications    Not on File        Allergies:     Patient has no known allergies. Social History:     Tobacco:    reports that he has never smoked. He has never used smokeless tobacco.  Alcohol:      reports previous alcohol use. Drug Use:  reports current drug use. Drug: Other-see comments. Family History:     History reviewed. No pertinent family history. Review of Systems:     Positive and Negative as described in HPI. Review of Systems   Constitutional: Negative. HENT: Negative. Eyes: Negative. Respiratory: Negative. Cardiovascular: Negative. Gastrointestinal: Negative. Endocrine: Negative. Genitourinary: Negative. Musculoskeletal: Negative. Neurological: Negative. Physical Exam:   BP (!) 168/65   Pulse 78   Temp 98.7 °F (37.1 °C) (Axillary)   Resp 14   Ht 5' 6\" (1.676 m)   Wt 185 lb (83.9 kg)   SpO2 97%   BMI 29.86 kg/m²   Temp (24hrs), Av.2 °F (36.8 °C), Min:97.5 °F (36.4 °C), Max:98.8 °F (37.1 °C)    No results for input(s): POCGLU in the last 72 hours. Intake/Output Summary (Last 24 hours) at 2020 1507  Last data filed at 2020 1111  Gross per 24 hour   Intake 657 ml   Output 200 ml   Net 457 ml       Physical Exam  Vitals signs and nursing note reviewed. Constitutional:       General: He is not in acute distress. Appearance: He is not diaphoretic. Comments: Patient had BiPAP on    Eyes:      General: No scleral icterus. Pupils: Pupils are equal, round, and reactive to light. Cardiovascular:      Heart sounds: Normal heart sounds.  No murmur. No friction rub. No gallop. Pulmonary:      Comments: Bilateral harsh breathing sounds    Abdominal:      Tenderness: There is no abdominal tenderness. There is no guarding. Musculoskeletal:         General: No swelling or tenderness. Skin:     Capillary Refill: Capillary refill takes less than 2 seconds. Coloration: Skin is not jaundiced. Neurological:      General: No focal deficit present. Motor: No weakness. Investigations:     Laboratory Testing:  Recent Results (from the past 24 hour(s))   Troponin    Collection Time: 08/27/20  5:00 PM   Result Value Ref Range    Troponin, High Sensitivity 46 (H) 0 - 22 ng/L    Troponin T NOT REPORTED <0.03 ng/mL    Troponin Interp NOT REPORTED    Urinalysis, reflex to microscopic    Collection Time: 08/27/20  7:22 PM   Result Value Ref Range    Color, UA YELLOW YELLOW    Turbidity UA CLOUDY (A) CLEAR    Glucose, Ur NEGATIVE NEGATIVE    Bilirubin Urine NEGATIVE NEGATIVE    Ketones, Urine NEGATIVE NEGATIVE    Specific Gravity, UA 1.007 1.000 - 1.030    Urine Hgb NEGATIVE NEGATIVE    pH, UA 5.5 5.0 - 8.0    Protein, UA 1+ (A) NEGATIVE    Urobilinogen, Urine Normal Normal    Nitrite, Urine NEGATIVE NEGATIVE    Leukocyte Esterase, Urine NEGATIVE NEGATIVE    Urinalysis Comments NOT REPORTED    Microscopic Urinalysis    Collection Time: 08/27/20  7:22 PM   Result Value Ref Range    -          WBC, UA 2 TO 5 /HPF    RBC, UA 0 TO 2 /HPF    Casts UA HYALINE /LPF    Casts UA 5 TO 10 /LPF    Crystals, UA NOT REPORTED None /HPF    Epithelial Cells UA 0 TO 2 /HPF    Renal Epithelial, UA NOT REPORTED 0 /HPF    Bacteria, UA None None    Mucus, UA 1+ (A) None    Trichomonas, UA NOT REPORTED None    Amorphous, UA 1+ (A) None    Other Observations UA NOT REPORTED NOT REQ.     Yeast, UA NOT REPORTED None   Arterial Blood Gases    Collection Time: 08/27/20  9:00 PM   Result Value Ref Range    pH, Arterial 7.190 (LL) 7.350 - 7.450    pCO2, Arterial 54.5 (HH) 35.0 - 45.0 mmHg    pO2, Arterial 73.1 (L) 80.0 - 100.0 mmHg    HCO3, Arterial 20.8 (L) 22.0 - 26.0 mmol/L    Positive Base Excess, Art NOT REPORTED 0.0 - 2.0 mmol/L    Negative Base Excess, Art 7.4 (H) 0.0 - 2.0 mmol/L    O2 Sat, Arterial 88.6 (L) 95 - 98 %    Total Hb NOT REPORTED 12.0 - 16.0 g/dl    Oxyhemoglobin NOT REPORTED 95.0 - 98.0 %    Carboxyhemoglobin 4.6 0 - 5 %    Methemoglobin NOT REPORTED 0.0 - 1.9 %    Pt Temp 37.0     pH, Art, Temp Adj NOT REPORTED 7.350 - 7.450    pCO2, Art, Temp Adj NOT REPORTED 35.0 - 45.0    pO2, Art, Temp Adj NOT REPORTED 80.0 - 100.0 mmHg    O2 Device/Flow/% Cannula     Respiratory Rate NOT REPORTED     Jose Test PASS     Sample Site Right Radial Artery     Pt.  Position SEMI-FOWLERS     Mode NOT REPORTED     Set Rate NOT REPORTED     Total Rate NOT REPORTED     VT NOT REPORTED     FIO2 NOT REPORTED     Peep/Cpap NOT REPORTED     PSV NOT REPORTED     Text for Respiratory  RESULTS GIVEN TO RN.     NOTIFICATION NOT REPORTED     NOTIFICATION TIME NOT REPORTED    Troponin    Collection Time: 08/27/20 10:40 PM   Result Value Ref Range    Troponin, High Sensitivity 61 (HH) 0 - 22 ng/L    Troponin T NOT REPORTED <0.03 ng/mL    Troponin Interp NOT REPORTED    Basic Metabolic Panel w/ Reflex to MG    Collection Time: 08/27/20 10:40 PM   Result Value Ref Range    Glucose 89 70 - 99 mg/dL    BUN 16 6 - 20 mg/dL    CREATININE 0.82 0.70 - 1.20 mg/dL    Bun/Cre Ratio NOT REPORTED 9 - 20    Calcium 8.4 (L) 8.6 - 10.4 mg/dL    Sodium 141 135 - 144 mmol/L    Potassium 4.8 3.7 - 5.3 mmol/L    Chloride 110 (H) 98 - 107 mmol/L    CO2 18 (L) 20 - 31 mmol/L    Anion Gap 13 9 - 17 mmol/L    GFR Non-African American >60 >60 mL/min    GFR African American >60 >60 mL/min    GFR Comment          GFR Staging NOT REPORTED    BLOOD GAS, ARTERIAL    Collection Time: 08/27/20 11:15 PM   Result Value Ref Range    pH, Arterial 7.237 (LL) 7.350 - 7.450    pCO2, Arterial 46.9 (H) 35.0 - 45.0 mmHg    pO2, Arterial 108.0 (H) 80.0 - 100.0 mmHg    HCO3, Arterial 19.9 (L) 22.0 - 26.0 mmol/L    Positive Base Excess, Art NOT REPORTED 0.0 - 2.0 mmol/L    Negative Base Excess, Art 7.5 (H) 0.0 - 2.0 mmol/L    O2 Sat, Arterial 94.0 (L) 95 - 98 %    Total Hb NOT REPORTED 12.0 - 16.0 g/dl    Oxyhemoglobin NOT REPORTED 95.0 - 98.0 %    Carboxyhemoglobin 3.1 0 - 5 %    Methemoglobin 0.8 0.0 - 1.9 %    Pt Temp 37     pH, Art, Temp Adj NOT REPORTED 7.350 - 7.450    pCO2, Art, Temp Adj NOT REPORTED 35.0 - 45.0    pO2, Art, Temp Adj NOT REPORTED 80.0 - 100.0 mmHg    O2 Device/Flow/% BIPAP     Respiratory Rate 23     Jose Test PASS     Sample Site Left Radial Artery     Pt. Position SEMI-FOWLERS     Mode IPAP 16, EPAP 10, 30%     Set Rate NOT REPORTED     Total Rate NOT REPORTED     VT NOT REPORTED     FIO2 NOT REPORTED     Peep/Cpap NOT REPORTED     PSV NOT REPORTED     Text for Respiratory RESULTS GIVEN TO MARGOT CORONADO     NOTIFICATION NOT REPORTED     NOTIFICATION TIME NOT REPORTED    Drug Scr, Abuse, Ur    Collection Time: 08/27/20 11:57 PM   Result Value Ref Range    Amphetamine Screen, Ur NEGATIVE NEGATIVE    Barbiturate Screen, Ur NEGATIVE NEGATIVE    Benzodiazepine Screen, Urine NEGATIVE NEGATIVE    Cocaine Metabolite, Urine NEGATIVE NEGATIVE    Methadone Screen, Urine NEGATIVE NEGATIVE    Opiates, Urine NEGATIVE NEGATIVE    Phencyclidine, Urine NEGATIVE NEGATIVE    Propoxyphene, Urine NOT REPORTED NEGATIVE    Cannabinoid Scrn, Ur NEGATIVE NEGATIVE    Oxycodone Screen, Ur NEGATIVE NEGATIVE    Methamphetamine, Urine NOT REPORTED NEGATIVE    Tricyclic Antidepressants, Urine NOT REPORTED NEGATIVE    MDMA, Urine NOT REPORTED NEGATIVE    Buprenorphine Urine NOT REPORTED NEGATIVE    Test Information       Assay provides medical screening only. The absence of expected drug(s) and/or metabolite(s) may indicate diluted or adulterated urine, limitations of testing or timing of collection.    Basic Metabolic Panel w/ Reflex to MG    Collection Time: 08/28/20  3:34 AM   Result Value Ref Range    Glucose 83 70 - 99 mg/dL    BUN 17 6 - 20 mg/dL    CREATININE 0.82 0.70 - 1.20 mg/dL    Bun/Cre Ratio NOT REPORTED 9 - 20    Calcium 8.6 8.6 - 10.4 mg/dL    Sodium 141 135 - 144 mmol/L    Potassium 4.8 3.7 - 5.3 mmol/L    Chloride 108 (H) 98 - 107 mmol/L    CO2 22 20 - 31 mmol/L    Anion Gap 11 9 - 17 mmol/L    GFR Non-African American >60 >60 mL/min    GFR African American >60 >60 mL/min    GFR Comment          GFR Staging NOT REPORTED    CBC with DIFF    Collection Time: 08/28/20  3:34 AM   Result Value Ref Range    WBC 8.1 3.5 - 11.0 k/uL    RBC 4.19 (L) 4.5 - 5.9 m/uL    Hemoglobin 15.0 13.5 - 17.5 g/dL    Hematocrit 44.8 41 - 53 %    .9 (H) 80 - 100 fL    MCH 35.8 (H) 26 - 34 pg    MCHC 33.5 31 - 37 g/dL    RDW 14.2 11.5 - 14.9 %    Platelets 917 333 - 873 k/uL    MPV 7.5 6.0 - 12.0 fL    NRBC Automated NOT REPORTED per 100 WBC    Differential Type NOT REPORTED     Seg Neutrophils 65 36 - 66 %    Lymphocytes 19 (L) 24 - 44 %    Monocytes 15 (H) 1 - 7 %    Eosinophils % 0 0 - 4 %    Basophils 1 0 - 2 %    Immature Granulocytes NOT REPORTED 0 %    Segs Absolute 5.30 1.3 - 9.1 k/uL    Absolute Lymph # 1.60 1.0 - 4.8 k/uL    Absolute Mono # 1.20 0.1 - 1.3 k/uL    Absolute Eos # 0.00 0.0 - 0.4 k/uL    Basophils Absolute 0.00 0.0 - 0.2 k/uL    Absolute Immature Granulocyte NOT REPORTED 0.00 - 0.30 k/uL    WBC Morphology NOT REPORTED     RBC Morphology NOT REPORTED     Platelet Estimate NOT REPORTED    Arterial Blood Gases    Collection Time: 08/28/20  4:55 AM   Result Value Ref Range    pH, Arterial 7.249 (LL) 7.350 - 7.450    pCO2, Arterial 48.6 (HH) 35.0 - 45.0 mmHg    pO2, Arterial 99.0 80.0 - 100.0 mmHg    HCO3, Arterial 21.2 (L) 22.0 - 26.0 mmol/L    Positive Base Excess, Art NOT REPORTED 0.0 - 2.0 mmol/L    Negative Base Excess, Art 6.0 (H) 0.0 - 2.0 mmol/L    O2 Sat, Arterial 95.1 95 - 98 %    Total Hb NOT REPORTED 12.0 - 16.0 g/dl    Oxyhemoglobin NOT REPORTED 95.0 - 98.0 %    Carboxyhemoglobin 2.0 0 - 5 %    Methemoglobin 0.8 0.0 - 1.9 %    Pt Temp 37     pH, Art, Temp Adj NOT REPORTED 7.350 - 7.450    pCO2, Art, Temp Adj NOT REPORTED 35.0 - 45.0    pO2, Art, Temp Adj NOT REPORTED 80.0 - 100.0 mmHg    O2 Device/Flow/% BIPAP     Respiratory Rate 25     Jose Test PASS     Sample Site Left Radial Artery     Pt. Position SEMI-FOWLERS     Mode IPAP 18, EPAP 10, 30% O2     Set Rate NOT REPORTED     Total Rate NOT REPORTED     VT NOT REPORTED     FIO2 NOT REPORTED     Peep/Cpap NOT REPORTED     PSV NOT REPORTED     Text for Respiratory RESULTS GIVEN TO MARGOT CORONADO     NOTIFICATION NOT REPORTED     NOTIFICATION TIME NOT REPORTED    BLOOD GAS, ARTERIAL    Collection Time: 08/28/20  8:46 AM   Result Value Ref Range    pH, Arterial 7.285 (L) 7.350 - 7.450    pCO2, Arterial 48.3 (HH) 35.0 - 45.0 mmHg    pO2, Arterial 93.4 80.0 - 100.0 mmHg    HCO3, Arterial 22.9 22.0 - 26.0 mmol/L    Positive Base Excess, Art NOT REPORTED 0.0 - 2.0 mmol/L    Negative Base Excess, Art 3.8 (H) 0.0 - 2.0 mmol/L    O2 Sat, Arterial 95.0 95 - 98 %    Total Hb NOT REPORTED 12.0 - 16.0 g/dl    Oxyhemoglobin NOT REPORTED 95.0 - 98.0 %    Carboxyhemoglobin 1.7 0 - 5 %    Methemoglobin 1.0 0.0 - 1.9 %    Pt Temp 37.0     pH, Art, Temp Adj NOT REPORTED 7.350 - 7.450    pCO2, Art, Temp Adj NOT REPORTED 35.0 - 45.0    pO2, Art, Temp Adj NOT REPORTED 80.0 - 100.0 mmHg    O2 Device/Flow/% BIPAP     Respiratory Rate 22     Jose Test PASS     Sample Site Left Radial Artery     Pt.  Position SEMI-FOWLERS     Mode BIPAP18/10     Set Rate NOT REPORTED     Total Rate NOT REPORTED     VT NOT REPORTED     FIO2 30     Peep/Cpap NOT REPORTED     PSV NOT REPORTED     Text for Respiratory RESULTS TO DR Allie Harris     NOTIFICATION NOT REPORTED     NOTIFICATION TIME NOT REPORTED    Trop/Myoglobin    Collection Time: 08/28/20  8:51 AM   Result Value Ref Range    Troponin, High Sensitivity 73 (HH) 0 - 22 ng/L    Troponin T NOT REPORTED <0.03 ng/mL    Troponin Interp NOT REPORTED     Myoglobin 82 (H) 28 - 72 ng/mL   Brain Natriuretic Peptide    Collection Time: 08/28/20  8:51 AM   Result Value Ref Range    Pro-BNP 56 <300 pg/mL    BNP Interpretation Pro-BNP Reference Range:    Lactate Dehydrogenas    Collection Time: 08/28/20  8:51 AM   Result Value Ref Range     135 - 225 U/L   C-Reactive Protein    Collection Time: 08/28/20  8:51 AM   Result Value Ref Range    CRP 14.9 (H) 0.0 - 5.0 mg/L   Ferritin    Collection Time: 08/28/20  8:51 AM   Result Value Ref Range    Ferritin 161 30 - 400 ug/L   D-Dimer Test    Collection Time: 08/28/20 11:27 AM   Result Value Ref Range    D-Dimer, Quant 0.38 0.00 - 0.59 mg/L FEU       Imaging/Diagnostics:  Xr Chest Portable    Result Date: 8/27/2020  EXAMINATION: ONE XRAY VIEW OF THE CHEST 8/27/2020 1:44 pm COMPARISON: None. HISTORY: ORDERING SYSTEM PROVIDED HISTORY: cough TECHNOLOGIST PROVIDED HISTORY: cough Reason for Exam: cough, congestion Acuity: Acute Type of Exam: Initial FINDINGS: The cardiopericardial silhouette is mildly enlarged. There is mild age-indeterminate, asymmetric elevation of the left hemidiaphragm with mild adjacent streaky left basilar density. Otherwise clear lungs.  There is no significant pleural or mediastinal finding     Mild streaky left basilar atelectasis versus fibrosis Mild cardiomegaly       Assessment :      Primary Problem  Acute respiratory failure with hypoxia and hypercapnia [facility])    Active Hospital Problems    Diagnosis Date Noted    Hypertension [I10] 08/28/2020    Substance abuse ([facility] [address] [F19.10] 08/28/2020    ACS (acute coronary syndrome) ([facility] [address] [I24.9] 08/28/2020    JAY (acute kidney injury) ([facility] [address] [N17.9] 08/28/2020    Overdose [T50.901A]     Acute respiratory failure with hypoxia and hypercapnia (HCC) [J96.01, J96.02]     Respiratory acidosis [E87.2]     NSTEMI (non-ST elevated myocardial infarction) ([facility] [address] [I21.4]     Other emphysema ([facility] [address] [J43.8]     Hypoxia [R09.02] 08/27/2020       Plan:     Patient status Admit as inpatient in the  Medical ICU    1. Hypoxia secondary to medullary respiratory suppression, secondary to substance abuse:  -On BiPAP support   -Trend ABGs; Last ABG pH 7.2, CO2 48, O2 93, bicarb 22.9/ ABG on presentation pH 7.19, CO2 54, PaO2 73, bicarb 20.8  -Possible underlying causes; Asthma, COPD, TERELL?  -On DuoNeb nebulizer every 4 hours.  -Cardiac echocardiogram done today, estimated left ventricular ejection fraction 50 to 55%. Normal echocardiogram, no hypokinesia. 2. Elevated troponin, rule out ACS/NSTEMI  -Troponin levels up trending (41, 46, 61). -Consult cardiology  -EKG: Sinus tachycardia, septal infarct.  -Patient is set for cardiac cath on Monday to R/O NSTEMI, Possible coronary vasospasm.  -Pharmacotherapy used: on full dose Lovenox 80 mg subcutaneously 2 times per day, Added carvedilol 6.25 mg tablet orally q 12 hours, Metoprolol 5 mg IV PRN q 6 hours. Aspirin chewable tablet 81 mg orally daily. 3. JAY with creatinine 1.27:  -Creatinine today 0.8, with intravenous fluids of normal saline at 100 mL/h.  -BUN normal of 15.   -The rest of the BMP test parameters are within normal today. 4. Substance abuse:  -Urine drug tox screen is negative.   -Contacting Toxicology for possible other substance abuse considerations. 5. Hypertensive blood pressure readings, question rebound effects of substance abused/essential hypertension:  -Lorazepam discontinued as patient's ABGs are showing Co2 retain. Will consider Ativan in case of anxiety control. -CIWA protocol discontinued accordingly.  -Carvedilol 6.25 mg Orally every 12 hours, metoprolol 5 mg IV as needed every 6 hours.   6. Watch for alcohol withdrawal     Consultations:   IP CONSULT TO SOCIAL WORK  IP CONSULT TO PULMONOLOGY  IP CONSULT TO SOCIAL WORK  IP CONSULT TO CARDIOLOGY     Patient is admitted as inpatient status because of co-morbiditieslisted above, severity of signs and symptoms as outlined, requirement for current medical therapies and most importantly because of direct risk to patient if care not provided in a hospital setting. Therese Pro MD  8/28/2020  3:07 PM    Copy sent to Dr. Pandey primary care provider on file.

## 2020-08-28 NOTE — PROGRESS NOTES
Pt bipap continues to frequently alarm with low volumes and low ventilation. Pt irritated by bipap frequently alarming. IPAP titrated to 18 from 16. Pt tolerating the new setting well.

## 2020-08-29 ENCOUNTER — APPOINTMENT (OUTPATIENT)
Dept: GENERAL RADIOLOGY | Age: 45
DRG: 812 | End: 2020-08-29
Payer: MEDICAID

## 2020-08-29 LAB
ABSOLUTE EOS #: 0.26 K/UL (ref 0–0.4)
ABSOLUTE IMMATURE GRANULOCYTE: ABNORMAL K/UL (ref 0–0.3)
ABSOLUTE LYMPH #: 1.58 K/UL (ref 1–4.8)
ABSOLUTE MONO #: 0.56 K/UL (ref 0.1–1.3)
ANION GAP SERPL CALCULATED.3IONS-SCNC: 8 MMOL/L (ref 9–17)
BASOPHILS # BLD: 0 % (ref 0–2)
BASOPHILS ABSOLUTE: 0 K/UL (ref 0–0.2)
BUN BLDV-MCNC: 15 MG/DL (ref 6–20)
BUN/CREAT BLD: ABNORMAL (ref 9–20)
CALCIUM SERPL-MCNC: 8.8 MG/DL (ref 8.6–10.4)
CHLORIDE BLD-SCNC: 103 MMOL/L (ref 98–107)
CO2: 25 MMOL/L (ref 20–31)
CREAT SERPL-MCNC: 0.69 MG/DL (ref 0.7–1.2)
DIFFERENTIAL TYPE: ABNORMAL
EOSINOPHILS RELATIVE PERCENT: 5 % (ref 0–4)
GFR AFRICAN AMERICAN: >60 ML/MIN
GFR NON-AFRICAN AMERICAN: >60 ML/MIN
GFR SERPL CREATININE-BSD FRML MDRD: ABNORMAL ML/MIN/{1.73_M2}
GFR SERPL CREATININE-BSD FRML MDRD: ABNORMAL ML/MIN/{1.73_M2}
GLUCOSE BLD-MCNC: 81 MG/DL (ref 70–99)
HAV IGM SER IA-ACNC: NONREACTIVE
HCT VFR BLD CALC: 40.8 % (ref 41–53)
HEMOGLOBIN: 13.8 G/DL (ref 13.5–17.5)
HEPATITIS B CORE IGM ANTIBODY: NONREACTIVE
HEPATITIS B SURFACE ANTIGEN: NONREACTIVE
HEPATITIS C ANTIBODY: NONREACTIVE
IMMATURE GRANULOCYTES: ABNORMAL %
LYMPHOCYTES # BLD: 31 % (ref 24–44)
MCH RBC QN AUTO: 35.7 PG (ref 26–34)
MCHC RBC AUTO-ENTMCNC: 33.7 G/DL (ref 31–37)
MCV RBC AUTO: 105.8 FL (ref 80–100)
MONOCYTES # BLD: 11 % (ref 1–7)
MORPHOLOGY: ABNORMAL
NRBC AUTOMATED: ABNORMAL PER 100 WBC
PDW BLD-RTO: 13.6 % (ref 11.5–14.9)
PLATELET # BLD: 194 K/UL (ref 150–450)
PLATELET ESTIMATE: ABNORMAL
PMV BLD AUTO: 7.6 FL (ref 6–12)
POTASSIUM SERPL-SCNC: 4.2 MMOL/L (ref 3.7–5.3)
POTASSIUM SERPL-SCNC: 5.5 MMOL/L (ref 3.7–5.3)
RBC # BLD: 3.86 M/UL (ref 4.5–5.9)
RBC # BLD: ABNORMAL 10*6/UL
SEG NEUTROPHILS: 53 % (ref 36–66)
SEGMENTED NEUTROPHILS ABSOLUTE COUNT: 2.7 K/UL (ref 1.3–9.1)
SODIUM BLD-SCNC: 136 MMOL/L (ref 135–144)
WBC # BLD: 5.1 K/UL (ref 3.5–11)
WBC # BLD: ABNORMAL 10*3/UL

## 2020-08-29 PROCEDURE — 6360000002 HC RX W HCPCS: Performed by: INTERNAL MEDICINE

## 2020-08-29 PROCEDURE — 80074 ACUTE HEPATITIS PANEL: CPT

## 2020-08-29 PROCEDURE — 2060000000 HC ICU INTERMEDIATE R&B

## 2020-08-29 PROCEDURE — 2700000000 HC OXYGEN THERAPY PER DAY

## 2020-08-29 PROCEDURE — 84132 ASSAY OF SERUM POTASSIUM: CPT

## 2020-08-29 PROCEDURE — 99233 SBSQ HOSP IP/OBS HIGH 50: CPT | Performed by: INTERNAL MEDICINE

## 2020-08-29 PROCEDURE — 6370000000 HC RX 637 (ALT 250 FOR IP): Performed by: STUDENT IN AN ORGANIZED HEALTH CARE EDUCATION/TRAINING PROGRAM

## 2020-08-29 PROCEDURE — 6360000002 HC RX W HCPCS: Performed by: STUDENT IN AN ORGANIZED HEALTH CARE EDUCATION/TRAINING PROGRAM

## 2020-08-29 PROCEDURE — 97161 PT EVAL LOW COMPLEX 20 MIN: CPT

## 2020-08-29 PROCEDURE — 6370000000 HC RX 637 (ALT 250 FOR IP): Performed by: INTERNAL MEDICINE

## 2020-08-29 PROCEDURE — 86038 ANTINUCLEAR ANTIBODIES: CPT

## 2020-08-29 PROCEDURE — 71045 X-RAY EXAM CHEST 1 VIEW: CPT

## 2020-08-29 PROCEDURE — 36415 COLL VENOUS BLD VENIPUNCTURE: CPT

## 2020-08-29 PROCEDURE — 85025 COMPLETE CBC W/AUTO DIFF WBC: CPT

## 2020-08-29 PROCEDURE — 94640 AIRWAY INHALATION TREATMENT: CPT

## 2020-08-29 PROCEDURE — 94761 N-INVAS EAR/PLS OXIMETRY MLT: CPT

## 2020-08-29 PROCEDURE — 2580000003 HC RX 258: Performed by: INTERNAL MEDICINE

## 2020-08-29 PROCEDURE — 94660 CPAP INITIATION&MGMT: CPT

## 2020-08-29 PROCEDURE — 2500000003 HC RX 250 WO HCPCS: Performed by: STUDENT IN AN ORGANIZED HEALTH CARE EDUCATION/TRAINING PROGRAM

## 2020-08-29 PROCEDURE — 2500000003 HC RX 250 WO HCPCS: Performed by: INTERNAL MEDICINE

## 2020-08-29 PROCEDURE — 80048 BASIC METABOLIC PNL TOTAL CA: CPT

## 2020-08-29 RX ORDER — SODIUM POLYSTYRENE SULFONATE 4.1 MEQ/G
15 POWDER, FOR SUSPENSION ORAL; RECTAL ONCE
Status: COMPLETED | OUTPATIENT
Start: 2020-08-29 | End: 2020-08-29

## 2020-08-29 RX ORDER — AMLODIPINE BESYLATE 10 MG/1
10 TABLET ORAL DAILY
Status: DISCONTINUED | OUTPATIENT
Start: 2020-08-29 | End: 2020-08-31 | Stop reason: HOSPADM

## 2020-08-29 RX ORDER — IPRATROPIUM BROMIDE AND ALBUTEROL SULFATE 2.5; .5 MG/3ML; MG/3ML
1 SOLUTION RESPIRATORY (INHALATION)
Status: DISCONTINUED | OUTPATIENT
Start: 2020-08-30 | End: 2020-08-31 | Stop reason: HOSPADM

## 2020-08-29 RX ADMIN — ASPIRIN 81 MG 81 MG: 81 TABLET ORAL at 07:51

## 2020-08-29 RX ADMIN — IPRATROPIUM BROMIDE AND ALBUTEROL SULFATE 1 AMPULE: .5; 3 SOLUTION RESPIRATORY (INHALATION) at 10:56

## 2020-08-29 RX ADMIN — SODIUM CHLORIDE: 9 INJECTION, SOLUTION INTRAVENOUS at 02:22

## 2020-08-29 RX ADMIN — CARVEDILOL 6.25 MG: 6.25 TABLET, FILM COATED ORAL at 17:58

## 2020-08-29 RX ADMIN — CARVEDILOL 6.25 MG: 6.25 TABLET, FILM COATED ORAL at 07:51

## 2020-08-29 RX ADMIN — METOPROLOL TARTRATE 5 MG: 1 INJECTION, SOLUTION INTRAVENOUS at 03:49

## 2020-08-29 RX ADMIN — AMLODIPINE BESYLATE 10 MG: 10 TABLET ORAL at 06:43

## 2020-08-29 RX ADMIN — ENOXAPARIN SODIUM 80 MG: 100 INJECTION SUBCUTANEOUS at 07:51

## 2020-08-29 RX ADMIN — IPRATROPIUM BROMIDE AND ALBUTEROL SULFATE 1 AMPULE: .5; 3 SOLUTION RESPIRATORY (INHALATION) at 21:20

## 2020-08-29 RX ADMIN — SODIUM POLYSTYRENE SULFONATE 15 G: 1 POWDER ORAL; RECTAL at 11:47

## 2020-08-29 RX ADMIN — IPRATROPIUM BROMIDE AND ALBUTEROL SULFATE 1 AMPULE: .5; 3 SOLUTION RESPIRATORY (INHALATION) at 14:56

## 2020-08-29 RX ADMIN — FOLIC ACID: 5 INJECTION, SOLUTION INTRAMUSCULAR; INTRAVENOUS; SUBCUTANEOUS at 15:25

## 2020-08-29 RX ADMIN — IPRATROPIUM BROMIDE AND ALBUTEROL SULFATE 1 AMPULE: .5; 3 SOLUTION RESPIRATORY (INHALATION) at 06:52

## 2020-08-29 ASSESSMENT — PAIN SCALES - GENERAL
PAINLEVEL_OUTOF10: 0
PAINLEVEL_OUTOF10: 0

## 2020-08-29 NOTE — PROGRESS NOTES
ICU Progress Note (Non-Vent)  [facility]    Patient - Nahid Steele,  Age - 54 y.o.    - 1975      Room Number -    MRN -  [id number]   Acct # - [de-identified]  Date of Admission -  2020 12:44 PM    Events of Past 24 Hours   Patient is sitting up in a chair. No increased shortness of breath or chest pain. Vitals    height is 5' 6\" (1.676 m) and weight is 189 lb 8 oz (86 kg). His axillary temperature is 97.8 °F (36.6 °C). His blood pressure is 152/87 (abnormal) and his pulse is 62. His respiration is 15 and oxygen saturation is 95%.        Temperature Range: Temp: 97.8 °F (36.6 °C) Temp  Av °F (36.7 °C)  Min: 97.5 °F (36.4 °C)  Max: 98.7 °F (37.1 °C)  BP Range:  Systolic (28NDT), HRL:922 , Min:87 , NXE:190     Diastolic (43JHQ), YLZ:47, Min:49, Max:131    Pulse Range: Pulse  Av.8  Min: 47  Max: 83  Respiration Range: Resp  Avg: 15.7  Min: 10  Max: 24  Current Pulse Ox[de-identified]  SpO2: 95 %  24HR Pulse Ox Range:  SpO2  Av %  Min: 93 %  Max: 100 %  Oxygen Amount and Delivery: O2 Flow Rate (L/min): 2 L/min    Wt Readings from Last 3 Encounters:   20 189 lb 8 oz (86 kg)     I/O       Intake/Output Summary (Last 24 hours) at 2020 1502  Last data filed at 2020 0400  Gross per 24 hour   Intake 1984.1 ml   Output 350 ml   Net 1634.1 ml     DRAIN/TUBE OUTPUT       Invasive Lines   ICP PRESSURE RANGE  No data recorded  CVP PRESSURE RANGE  No data recorded      Medications      amLODIPine  10 mg Oral Daily    folic acid, thiamine, multi-vitamin with vitamin K infusion   Intravenous Once    ipratropium-albuterol  1 ampule Inhalation Q4H While awake    carvedilol  6.25 mg Oral BID WC    enoxaparin  1 mg/kg Subcutaneous BID    aspirin  81 mg Oral Daily    sodium chloride flush  10 mL Intravenous 2 times per day     metoprolol, acetaminophen, sodium chloride flush  IV Drips/Infusions   sodium chloride 100 mL/hr at 08/29/20 0222       Diet/Nutrition   Diet NPO, After Midnight    Exam      Constitutional - Alert, arousable  General Appearance  well developed, well nourished  HEENT -normocephalic, atraumatic. PERRLA  Lungs - Chest expands equally, no wheezes, rales or rhonchi. Cardiovascular - Heart sounds are normal.  normal rate and rhythm regular, no murmur, gallop or rub. Abdomen - soft, nontender, nondistended,     Extremities - no cyanosis, clubbing   Lab Results   CBC     Lab Results   Component Value Date    WBC 5.1 08/29/2020    RBC 3.86 08/29/2020    HGB 13.8 08/29/2020    HCT 40.8 08/29/2020     08/29/2020    .8 08/29/2020    MCH 35.7 08/29/2020    MCHC 33.7 08/29/2020    RDW 13.6 08/29/2020    LYMPHOPCT 31 08/29/2020    MONOPCT 11 08/29/2020    BASOPCT 0 08/29/2020    MONOSABS 0.56 08/29/2020    LYMPHSABS 1.58 08/29/2020    EOSABS 0.26 08/29/2020    BASOSABS 0.00 08/29/2020    DIFFTYPE NOT REPORTED 08/29/2020       BMP   Lab Results   Component Value Date     08/29/2020    K 5.5 08/29/2020     08/29/2020    CO2 25 08/29/2020    BUN 15 08/29/2020    CREATININE 0.69 08/29/2020    GLUCOSE 81 08/29/2020       LFTS  Lab Results   Component Value Date    ALKPHOS 40 08/27/2020    ALT 59 08/27/2020    AST 48 08/27/2020    PROT 7.3 08/27/2020    BILITOT 0.21 08/27/2020    LABALBU 4.4 08/27/2020       ABG ABGs:   Lab Results   Component Value Date    PHART 7.285 08/28/2020    PO2ART 93.4 08/28/2020    NFX3DPT 48.3 08/28/2020       Lab Results   Component Value Date    MODE BIPAP18/10 08/28/2020         INR  No results for input(s): PROTIME, INR in the last 72 hours. APTT  No results for input(s): APTT in the last 72 hours. Lactic Acid  No results found for: LACTA     BNP   No results for input(s): BNP in the last 72 hours.      Cultures       Radiology     CXR  Patient chest x-ray revealed persistent chronic left hemidiaphragm elevation    (See actual reports for details)      SYSTEMS ASSESSMENT    Concern for accidental substance overdose. Has a history of significant tobacco use and alcohol use  Concern for heart disease    Neuro       Respiratory   Wean oxygen as tolerated.  Keep O2 sat > 88%    Cardiovascular        Gastrointestinal       Renal       Infectious Disease       Hematology/Oncology       Endocrine       Social/Spiritual/DNR/Disposition/Other     Patient on DuoNeb treatments  On treatment doses of Lovenox  Progressive unit seems reasonable    Critical Care Time   0 min    Electronically signed by Bladimir Pat MD on 8/29/2020 at 3:02 PM

## 2020-08-29 NOTE — CARE COORDINATION
DISCHARGE PLANNING NOTE:    The discharge plan is home with no needs. PT recommends home independently. Patient scheduled to have cardiac cath on Monday. Echo done today. EF 50-55%. Will follow up with patient for possible substance abuse rehab.

## 2020-08-29 NOTE — PLAN OF CARE
Problem: Falls - Risk of:  Goal: Will remain free from falls  Description: Will remain free from falls  Outcome: Ongoing  Goal: Absence of physical injury  Description: Absence of physical injury  Outcome: Ongoing  Note: Patient's call button and bed-side table are within reach. Patient has demonstrated appropriate use of call button to express needs. Problem: Breathing Pattern - Ineffective:  Goal: Ability to achieve and maintain a regular respiratory rate will improve  Description: Ability to achieve and maintain a regular respiratory rate will improve  Outcome: Ongoing  Note: Patient remains on continuous BiPaP and is tolerating well.      Problem: Safety:  Goal: Free from accidental physical injury  Description: Free from accidental physical injury  Outcome: Ongoing  Goal: Free from intentional harm  Description: Free from intentional harm  Outcome: Ongoing     Problem: Daily Care:  Goal: Daily care needs are met  Description: Daily care needs are met  Outcome: Ongoing     Problem: Pain:  Goal: Patient's pain/discomfort is manageable  Description: Patient's pain/discomfort is manageable  Outcome: Ongoing     Problem: Discharge Planning:  Goal: Patients continuum of care needs are met  Description: Patients continuum of care needs are met  Outcome: Ongoing     Problem: Gas Exchange - Impaired:  Goal: Levels of oxygenation will improve  Description: Levels of oxygenation will improve  Outcome: Ongoing     Problem: Skin Integrity:  Goal: Will show no infection signs and symptoms  Description: Will show no infection signs and symptoms  Outcome: Ongoing  Goal: Absence of new skin breakdown  Description: Absence of new skin breakdown  Outcome: Ongoing

## 2020-08-29 NOTE — PROGRESS NOTES
Physical Therapy    Facility/Department: [facility] ICU  Initial Assessment    NAME: Rosalie Sanderson  : 1975  MRN: [id number]    Date of Service: 2020    Discharge Recommendations: Home Independently    PT Equipment Recommendations  Equipment Needed: No    Assessment  Assessment: All functional needs have been met  Prognosis: Excellent  PT Education: PT Role  No Skilled PT: Independent with functional mobility   REQUIRES PT FOLLOW UP: No      Patient Diagnosis(es): The primary encounter diagnosis was Accidental drug overdose, initial encounter. A diagnosis of Hypoxia was also pertinent to this visit. has a past medical history of Overdose.   has no past surgical history on file. Restrictions  Restrictions/Precautions  Restrictions/Precautions: Up as Tolerated  Vision/Hearing  Vision: Within Functional Limits  Hearing: Within functional limits       General  Chart Reviewed: Yes  Patient assessed for rehabilitation services?: Yes  Response To Previous Treatment: Not applicable  Family / Caregiver Present: No  Referring Practitioner: Kaveh Ellis MD  Referral Date : 20  Diagnosis: Acute respiratory failure with hypoxia and hypercapnia  Follows Commands: Within Functional Limits    Subjective  Pain Screening  Patient Currently in Pain: No  Orientation  Overall Orientation Status: Within Normal Limits  Social/Functional History  Lives With: Family(Lives with Mom and cares for her)  Type of Home: Apartment  Home Layout: One level  Home Access: Stairs to enter with rails  Entrance Stairs - Number of Steps: 4  Entrance Stairs - Rails: Right  Ambulation Assistance: Independent  Transfer Assistance: Independent    Objective  Bed mobility  Rolling to Left: Independent  Rolling to Right: Independent  Supine to Sit: Independent  Sit to Supine: Independent  Scooting: Independent  Transfers  Sit to Stand: Independent  Stand to sit:  Independent  Bed to Chair: Independent  Stand Pivot Transfers: Independent  Ambulation  Surface: level tile  Device: No Device  Assistance: Independent  Gait Deviations: None  Distance: 200 ft +    Plan  Comment: All functional needs met. Discontinue at this time.   Safety Devices  Type of devices: Left in chair, Call light within reach    AM-PAC Score  AM-PAC Inpatient Mobility without Stair Climbing Raw Score : 20 (08/29/20 0820)  AM-PAC Inpatient without Stair Climbing T-Scale Score : 60.57 (08/29/20 0820)  Mobility Inpatient CMS 0-100% Score: 0 (08/29/20 0820)  Mobility Inpatient without Stair CMS G-Code Modifier : [facility] (08/29/20 0820)    Therapy Time   Individual Concurrent Group Co-treatment   Time In 0820         Time Out 0850         Minutes [address] [facility], PT DPT

## 2020-08-29 NOTE — PROGRESS NOTES
Patient asking about cell phone, reports he is calling phone but it is just ringing. He claims RNs took phone from him when he arrived on PCU unit. Staff reports no knowledge of cell phone. Writer went to unit to assess for phone and was unsuccessful at finding phone. Writer called patient's sister Usha Mi and spoke to her about  patient and his missing phone. Usha Mi reports that the investigators on the patient's case confiscated the phone at the time of arrival. She reports watching the phone be taken off of the patient's person and bagged as evidence. Writer relayed this to patient and he is agreeable.

## 2020-08-29 NOTE — PROGRESS NOTES
Pt wore bipap overnight, pt taken off bipap by RT and placed on 3lnc. SpO2 98%.  Pt tolerating well and in no distress

## 2020-08-29 NOTE — PROGRESS NOTES
[address] [facility] [employer]    PROGRESS NOTE             8/29/2020    11:19 AM    Name:   Jean Pham  MRN:     [id number]     Acct:      [de-identified]   Room:   2001/2001-01  IP Day:  2  Admit Date:  8/27/2020 12:44 PM    PCP:  No primary care provider on file. Code Status:  Full Code    Subjective:     C/C:   Chief Complaint   Patient presents with    Drug Overdose     Interval History Status: improved. Patient seen and examined at the bed side, no new acute events overnight except that patient reportedly had SBP readings above 160s for which Dr. Chanell Kowalski ordered Norvasc 10 mg p.o. daily. He wore BiPAP overnight without desaturation and was placed on 3 L by nasal cannula this morning with SPO2 98%    This morning the patient is awake, alert, and oriented x3 and says he feels much improved. He is happy to report that he ambulate alone in the hallway with PT. Denies any pain whatsoever, chest pain, shortness of breath, leg pain or swelling, abdominal pain, or any other complaints at this time. Notes from nursing staff and Consults had been reviewed, and the overnight progress had been checked with the nursing staff as well. Brief History:     Please see H&P. Review of Systems:     CONSTITUTIONAL:  no fevers  EYES: negative for blury vision  HEENT: No headaches, no nasal congestion, no difficulty swallowing  RESPIRATORY:negative for dyspnea, no wheezing, no Cough  CARDIOVASCULAR: negative for chest pain, no palpitations  GASTROINTESTINAL: no nausea, no vomiting, no change in bowel habits, no abdominal pain   GENITOURINARY: negative for dysuria, no hematuria   MUSCULOSKELETAL: no joint pains, no muscle aches, no swelling of joints or extremities  NEUROLOGICAL: No weakness or numbness      Medications:      Allergies:    No Known Allergies    Current Meds:   Scheduled Meds:    amLODIPine  10 mg Oral Daily    sodium polystyrene  15 g Oral Once  ipratropium-albuterol  1 ampule Inhalation Q4H While awake    carvedilol  6.25 mg Oral BID WC    enoxaparin  1 mg/kg Subcutaneous BID    aspirin  81 mg Oral Daily    sodium chloride flush  10 mL Intravenous 2 times per day     Continuous Infusions:    sodium chloride 100 mL/hr at 20 0222     PRN Meds: metoprolol, acetaminophen, sodium chloride flush    Data:     Past Medical History:   has a past medical history of Overdose. Social History:   reports that he has never smoked. He has never used smokeless tobacco. He reports previous alcohol use. He reports current drug use. Drug: Other-see comments. Family History:   History reviewed. No pertinent family history. Vitals:  BP (!) 171/80   Pulse 69   Temp 97.8 °F (36.6 °C) (Axillary)   Resp 10   Ht 5' 6\" (1.676 m)   Wt 189 lb 8 oz (86 kg)   SpO2 100%   BMI 30.59 kg/m²   Temp (24hrs), Av.1 °F (36.7 °C), Min:97.5 °F (36.4 °C), Max:98.7 °F (37.1 °C)      No results for input(s): POCGLU in the last 72 hours. I/O(24Hr): Intake/Output Summary (Last 24 hours) at 2020 1119  Last data filed at 2020 0400  Gross per 24 hour   Intake 1984.1 ml   Output 350 ml   Net 1634.1 ml       Labs:    CBC with Differential:    Lab Results   Component Value Date    WBC 5.1 2020    RBC 3.86 2020    HGB 13.8 2020    HCT 40.8 2020     2020    .8 2020    MCH 35.7 2020    MCHC 33.7 2020    RDW 13.6 2020    LYMPHOPCT 31 2020    MONOPCT 11 2020    BASOPCT 0 2020    MONOSABS 0.56 2020    LYMPHSABS 1.58 2020    EOSABS 0.26 2020    BASOSABS 0.00 2020    DIFFTYPE NOT REPORTED 2020       No results found for: SPECIAL  No results found for: CULTURE      Radiology:    Xr Chest Portable    Result Date: 2020  EXAMINATION: ONE XRAY VIEW OF THE CHEST 2020 1:44 pm COMPARISON: None.  HISTORY: ORDERING SYSTEM PROVIDED HISTORY: cough hypercapnia, secondary to unknown substance ingestion  -He wore BiPAP overnight without desaturation and this morning was placed on 3 L by nasal cannula with SPO2 98%  -CXR this morning: Unchanged appearance of the chest without acute airspace disease identified.   -Initial CXR: Mild streaky left basilar atelectasis versus fibrosis. Mild cardiomegaly.  -Last ABG pH 7.2, CO2 48, O2 93, bicarb 22.9/ ABG on presentation pH 7.19, CO2 54, PaO2 73, bicarb 20.8  -Possible underlying comorbidity: Asthma, COPD, TERELL?  -On DuoNeb nebulizer every 4 hours  -Cardiac echocardiogram done today, estimated left ventricular ejection fraction 50 to 55%. Normal echocardiogram, no hypokinesia.         Elevated troponin, rule out ACS/NSTEMI  -Troponin levels up trendin > 46 > 61 > 73  -EKG: Sinus tachycardia, septal infarct.  -Patient is set for cardiac cath on Monday to R/O NSTEMI, Possible coronary vasospasm.  -full dose Lovenox 80 mg subcutaneously 2 times per day   -ASA 81 mg p.o. daily      JAY with initial creatinine 1.27:  -Creatinine 0.69 today, improved from 0.82 yesterday and 1.27 on admission  intravenous fluids of normal saline at 100 mL/h.  -Total urine output yesterday was 550 mL      Hyperkalemia  -5.5 this morning, increased from 4.8 yesterday and 3.5 on admission  -We will check potassium again this afternoon  -One-time dose of Kayexalate ordered      Substance abuse:  -Urine drug tox screen is negative.   -Contacting Toxicology for possible other substance abuse considerations.  -Monitor for alcohol withdrawal  -CIWA protocol discontinued due to CO2 retention  -Consider Precedex if he becomes agitated    Hypertension  -163/83 this morning  -Per RN, patient becomes hypertensive at night, we will watch his blood pressure today and consider adding an antihypertensive medication at night.  -carvedilol 6.25 mg tablet orally q 12 hours  -Norvasc 10 mg p.o. daily  -Lopressor 5 mg IV every 6 hours as needed, received yesterday at [id number] and today at 36        DVT prophylaxis: already anticoagulated with Full dose Lovenox  GI prophylaxis: Not indicated    Chau Dsouza MD  8/29/2020  11:19 AM     Attending Physician Statement  I have discussed the care of Jean Pham and I have examined the patient myselft and taken ros and hpi , including pertinent history and exam findings,  with the resident. I have reviewed the key elements of all parts of the encounter with the resident. I agree with the assessment, plan and orders as documented by the resident.   This is a gentleman who is alcohol abuse altered mental status acute respiratory failure on BiPAP toxin was negative presumed synthetic drug use patient denies any drug use and has amnesia retrograde intermittent BiPAP respiratory acidosis without metabolic compensation lowest pH was 7.19  Continue BiPAP support patient does well may be moved to progressive today  Troponin elevation possible underlying coronary artery disease cardiac cath planned for Monday    Electronically signed by Helio Marks MD

## 2020-08-29 NOTE — PLAN OF CARE
Problem: Falls - Risk of:  Goal: Will remain free from falls  Description: Will remain free from falls  8/29/2020 1748 by Zenia Suresh RN  Outcome: Met This Shift  8/29/2020 0627 by Dahlia Meadows RN  Outcome: Ongoing  Goal: Absence of physical injury  Description: Absence of physical injury  8/29/2020 1748 by Zenia Suresh RN  Outcome: Met This Shift  8/29/2020 0627 by Dahlia Meadows RN  Outcome: Ongoing  Note: Patient's call button and bed-side table are within reach. Patient has demonstrated appropriate use of call button to express needs. Problem: Breathing Pattern - Ineffective:  Goal: Ability to achieve and maintain a regular respiratory rate will improve  Description: Ability to achieve and maintain a regular respiratory rate will improve  8/29/2020 1748 by Zenia Suresh RN  Outcome: Met This Shift  8/29/2020 0627 by Dahlia Meadows RN  Outcome: Ongoing  Note: Patient remains on continuous BiPaP and is tolerating well.      Problem: Safety:  Goal: Free from accidental physical injury  Description: Free from accidental physical injury  8/29/2020 1748 by Zenia Suresh RN  Outcome: Met This Shift  8/29/2020 0627 by Dahlia Meadows RN  Outcome: Ongoing  Goal: Free from intentional harm  Description: Free from intentional harm  8/29/2020 1748 by Zenia Suresh RN  Outcome: Met This Shift  8/29/2020 0627 by Dahlia Meadows RN  Outcome: Ongoing     Problem: Daily Care:  Goal: Daily care needs are met  Description: Daily care needs are met  8/29/2020 1748 by Zenia Suresh RN  Outcome: Met This Shift  8/29/2020 0627 by Dahlia Meadows RN  Outcome: Ongoing     Problem: Pain:  Goal: Patient's pain/discomfort is manageable  Description: Patient's pain/discomfort is manageable  8/29/2020 1748 by Zenia Suresh RN  Outcome: Met This Shift  8/29/2020 0627 by Dahlia Meadows RN  Outcome: Ongoing     Problem: Gas Exchange - Impaired:  Goal: Levels of oxygenation will improve  Description: Levels of oxygenation will improve  8/29/2020 1748 by Cruz Bee RN  Outcome: Met This Shift  8/29/2020 0627 by Monico Ko RN  Outcome: Ongoing     Problem: Skin Integrity:  Goal: Will show no infection signs and symptoms  Description: Will show no infection signs and symptoms  8/29/2020 1748 by Cruz Bee RN  Outcome: Met This Shift  8/29/2020 0627 by Monico Ko RN  Outcome: Ongoing  Goal: Absence of new skin breakdown  Description: Absence of new skin breakdown  8/29/2020 1748 by Cruz Bee RN  Outcome: Met This Shift  8/29/2020 0627 by Monico Ko RN  Outcome: Ongoing

## 2020-08-30 PROBLEM — J44.9 OSA AND COPD OVERLAP SYNDROME (HCC): Chronic | Status: ACTIVE | Noted: 2020-08-30

## 2020-08-30 PROBLEM — G47.33 OSA AND COPD OVERLAP SYNDROME (HCC): Chronic | Status: ACTIVE | Noted: 2020-08-30

## 2020-08-30 LAB
ABSOLUTE BANDS #: 0.18 K/UL (ref 0–1)
ABSOLUTE EOS #: 0.09 K/UL (ref 0–0.4)
ABSOLUTE IMMATURE GRANULOCYTE: ABNORMAL K/UL (ref 0–0.3)
ABSOLUTE LYMPH #: 1.08 K/UL (ref 1–4.8)
ABSOLUTE MONO #: 0.41 K/UL (ref 0.1–1.3)
ANION GAP SERPL CALCULATED.3IONS-SCNC: 7 MMOL/L (ref 9–17)
ATYPICAL LYMPHOCYTE ABSOLUTE COUNT: 0.18 K/UL
ATYPICAL LYMPHOCYTES: 4 %
BANDS: 4 % (ref 0–10)
BASOPHILS # BLD: 2 % (ref 0–2)
BASOPHILS ABSOLUTE: 0.09 K/UL (ref 0–0.2)
BUN BLDV-MCNC: 6 MG/DL (ref 6–20)
BUN/CREAT BLD: ABNORMAL (ref 9–20)
CALCIUM SERPL-MCNC: 8.7 MG/DL (ref 8.6–10.4)
CHLORIDE BLD-SCNC: 101 MMOL/L (ref 98–107)
CO2: 28 MMOL/L (ref 20–31)
CREAT SERPL-MCNC: 0.57 MG/DL (ref 0.7–1.2)
DIFFERENTIAL TYPE: ABNORMAL
EOSINOPHILS RELATIVE PERCENT: 2 % (ref 0–4)
GFR AFRICAN AMERICAN: >60 ML/MIN
GFR NON-AFRICAN AMERICAN: >60 ML/MIN
GFR SERPL CREATININE-BSD FRML MDRD: ABNORMAL ML/MIN/{1.73_M2}
GFR SERPL CREATININE-BSD FRML MDRD: ABNORMAL ML/MIN/{1.73_M2}
GLUCOSE BLD-MCNC: 135 MG/DL (ref 70–99)
HCT VFR BLD CALC: 40.2 % (ref 41–53)
HEMOGLOBIN: 14.2 G/DL (ref 13.5–17.5)
IMMATURE GRANULOCYTES: ABNORMAL %
LYMPHOCYTES # BLD: 24 % (ref 24–44)
MCH RBC QN AUTO: 36.1 PG (ref 26–34)
MCHC RBC AUTO-ENTMCNC: 35.2 G/DL (ref 31–37)
MCV RBC AUTO: 102.4 FL (ref 80–100)
MONOCYTES # BLD: 9 % (ref 1–7)
MORPHOLOGY: NORMAL
NRBC AUTOMATED: ABNORMAL PER 100 WBC
PDW BLD-RTO: 13 % (ref 11.5–14.9)
PLATELET # BLD: 215 K/UL (ref 150–450)
PLATELET ESTIMATE: ABNORMAL
PMV BLD AUTO: 7.8 FL (ref 6–12)
POTASSIUM SERPL-SCNC: 4.4 MMOL/L (ref 3.7–5.3)
RBC # BLD: 3.92 M/UL (ref 4.5–5.9)
RBC # BLD: ABNORMAL 10*6/UL
SEG NEUTROPHILS: 55 % (ref 36–66)
SEGMENTED NEUTROPHILS ABSOLUTE COUNT: 2.47 K/UL (ref 1.3–9.1)
SODIUM BLD-SCNC: 136 MMOL/L (ref 135–144)
WBC # BLD: 4.5 K/UL (ref 3.5–11)
WBC # BLD: ABNORMAL 10*3/UL

## 2020-08-30 PROCEDURE — 99232 SBSQ HOSP IP/OBS MODERATE 35: CPT | Performed by: INTERNAL MEDICINE

## 2020-08-30 PROCEDURE — 94761 N-INVAS EAR/PLS OXIMETRY MLT: CPT

## 2020-08-30 PROCEDURE — 6360000002 HC RX W HCPCS: Performed by: STUDENT IN AN ORGANIZED HEALTH CARE EDUCATION/TRAINING PROGRAM

## 2020-08-30 PROCEDURE — 94640 AIRWAY INHALATION TREATMENT: CPT

## 2020-08-30 PROCEDURE — 6370000000 HC RX 637 (ALT 250 FOR IP): Performed by: INTERNAL MEDICINE

## 2020-08-30 PROCEDURE — 80048 BASIC METABOLIC PNL TOTAL CA: CPT

## 2020-08-30 PROCEDURE — 6370000000 HC RX 637 (ALT 250 FOR IP): Performed by: STUDENT IN AN ORGANIZED HEALTH CARE EDUCATION/TRAINING PROGRAM

## 2020-08-30 PROCEDURE — 94660 CPAP INITIATION&MGMT: CPT

## 2020-08-30 PROCEDURE — 2060000000 HC ICU INTERMEDIATE R&B

## 2020-08-30 PROCEDURE — 2580000003 HC RX 258: Performed by: INTERNAL MEDICINE

## 2020-08-30 PROCEDURE — 94664 DEMO&/EVAL PT USE INHALER: CPT

## 2020-08-30 PROCEDURE — 85025 COMPLETE CBC W/AUTO DIFF WBC: CPT

## 2020-08-30 PROCEDURE — 36415 COLL VENOUS BLD VENIPUNCTURE: CPT

## 2020-08-30 RX ADMIN — CARVEDILOL 6.25 MG: 6.25 TABLET, FILM COATED ORAL at 17:42

## 2020-08-30 RX ADMIN — ASPIRIN 81 MG 81 MG: 81 TABLET ORAL at 09:04

## 2020-08-30 RX ADMIN — Medication 10 ML: at 09:06

## 2020-08-30 RX ADMIN — CARVEDILOL 6.25 MG: 6.25 TABLET, FILM COATED ORAL at 09:04

## 2020-08-30 RX ADMIN — Medication 10 ML: at 21:00

## 2020-08-30 RX ADMIN — AMLODIPINE BESYLATE 10 MG: 10 TABLET ORAL at 09:04

## 2020-08-30 RX ADMIN — IPRATROPIUM BROMIDE AND ALBUTEROL SULFATE 1 AMPULE: .5; 3 SOLUTION RESPIRATORY (INHALATION) at 07:06

## 2020-08-30 RX ADMIN — IPRATROPIUM BROMIDE AND ALBUTEROL SULFATE 1 AMPULE: .5; 3 SOLUTION RESPIRATORY (INHALATION) at 14:51

## 2020-08-30 RX ADMIN — IPRATROPIUM BROMIDE AND ALBUTEROL SULFATE 1 AMPULE: .5; 3 SOLUTION RESPIRATORY (INHALATION) at 20:16

## 2020-08-30 RX ADMIN — ENOXAPARIN SODIUM 80 MG: 100 INJECTION SUBCUTANEOUS at 12:29

## 2020-08-30 RX ADMIN — IPRATROPIUM BROMIDE AND ALBUTEROL SULFATE 1 AMPULE: .5; 3 SOLUTION RESPIRATORY (INHALATION) at 10:44

## 2020-08-30 NOTE — PLAN OF CARE
Problem: Falls - Risk of:  Goal: Will remain free from falls  Description: Will remain free from falls  8/30/2020 1534 by Arthur Biggs RN  Outcome: Ongoing  8/30/2020 0311 by Jillian Carroll RN  Outcome: Ongoing  Note: Pt remains free from falls this shift. Pt has bed wheels locked, call light within reach, and fall sign posted. Pt continues to be educated about fall risks. Will continue to monitor. Goal: Absence of physical injury  Description: Absence of physical injury  8/30/2020 1534 by Arthur Biggs RN  Outcome: Ongoing  8/30/2020 0311 by Jillian Carroll RN  Outcome: Ongoing     Problem: Breathing Pattern - Ineffective:  Goal: Ability to achieve and maintain a regular respiratory rate will improve  Description: Ability to achieve and maintain a regular respiratory rate will improve  8/30/2020 1534 by Arthur Biggs RN  Outcome: Ongoing  8/30/2020 0311 by Jillian Carroll RN  Outcome: Ongoing  Note: Pt able to maintain proper oxygenation at this time. Pt currently on oxygen and will be monitored for any worsening condition r/t oxygenation. Will continue to monitor. Problem: Safety:  Goal: Free from accidental physical injury  Description: Free from accidental physical injury  8/30/2020 1534 by Arthur Biggs RN  Outcome: Ongoing  8/30/2020 0311 by Jillian Carroll RN  Outcome: Ongoing  Goal: Free from intentional harm  Description: Free from intentional harm  8/30/2020 1534 by Arthur Biggs RN  Outcome: Ongoing  8/30/2020 0311 by Jillian Carroll RN  Outcome: Ongoing  Note: Pt is free from injury this shift. Pt continues to be educated on safety and encouraged to call when needing to ambulate to and from bathroom. Problem: Daily Care:  Goal: Daily care needs are met  Description: Daily care needs are met  8/30/2020 1534 by Arthur Biggs RN  Outcome: Ongoing  8/30/2020 0311 by Jillian Carroll RN  Outcome: Ongoing  Note: Pt showing increased interest in taking care of daily needs.  Pt will need reinforced learning to motivate pt to take care of self and be independent. Will continue to monitor. Problem: Pain:  Goal: Patient's pain/discomfort is manageable  Description: Patient's pain/discomfort is manageable  8/30/2020 1534 by Annis Kocher, RN  Outcome: Ongoing  8/30/2020 0311 by Monster Worthy RN  Outcome: Ongoing  Note: Pt has pain controlled at this time. See MAR. Continue to assist with repositioning for comfort. Problem: Discharge Planning:  Goal: Patients continuum of care needs are met  Description: Patients continuum of care needs are met  8/30/2020 1534 by Annis Kocher, RN  Outcome: Ongoing  8/30/2020 0311 by Monster Worthy RN  Outcome: Ongoing  Note: Pt showing increased interest in taking care of daily needs. Pt will need reinforced learning to motivate pt to take care of self and be independent. Will continue to monitor. Problem: Gas Exchange - Impaired:  Goal: Levels of oxygenation will improve  Description: Levels of oxygenation will improve  8/30/2020 1534 by Annis Kocher, RN  Outcome: Ongoing  8/30/2020 0311 by Monster Worthy RN  Outcome: Ongoing     Problem: Skin Integrity:  Goal: Will show no infection signs and symptoms  Description: Will show no infection signs and symptoms  8/30/2020 1534 by Annis Kocher, RN  Outcome: Ongoing  8/30/2020 0311 by Monster Worthy RN  Outcome: Ongoing  Note: Pt showing no new signs/symptoms of infection at this time. Pt being treated with antibiotics for current infection. See MAR    Goal: Absence of new skin breakdown  Description: Absence of new skin breakdown  8/30/2020 1534 by Annis Kocher, RN  Outcome: Ongoing  8/30/2020 0311 by Monster Worthy RN  Outcome: Ongoing  Note: Pt has no new signs of skin breakdown. Pt continues to be repositioned Q2hr prn.

## 2020-08-30 NOTE — PROGRESS NOTES
[address] [facility].    PROGRESS NOTE             8/30/2020    9:06 AM    Name:   Alvino Kelley  MRN:     [id number]     Acct:      [de-identified]   Room:   2121/2121-01   Day:  3  Admit Date:  8/27/2020 12:44 PM    PCP:  No primary care provider on file. Code Status:  Full Code    Subjective:     C/C:   Chief Complaint   Patient presents with    Drug Overdose     Interval History Status: improved. Patient seen and examined at the bed side, no new acute events overnight he states that he feels significantly improved and completely asymptomatic this morning. He reports that he is ambulating around the halls without difficulty. Denies chest pain, shortness of breath, leg pain or swelling, cough, or any other complaints at this time. He wore BiPAP overnight without desaturation and is breathing room air without desaturation during my interview. Patient is still scheduled to become n.p.o. this midnight for cath tomorrow. Notes from nursing staff and Consults had been reviewed, and the overnight progress had been checked with the nursing staff as well. Brief History:     Please see H&P. Review of Systems:     CONSTITUTIONAL:  no fevers  EYES: negative for blury vision  HEENT: No headaches, no nasal congestion, no difficulty swallowing  RESPIRATORY:negative for dyspnea, no wheezing, no Cough  CARDIOVASCULAR: negative for chest pain, no palpitations  GASTROINTESTINAL: no nausea, no vomiting, no change in bowel habits, no abdominal pain   GENITOURINARY: negative for dysuria, no hematuria   MUSCULOSKELETAL: no joint pains, no muscle aches, no swelling of joints or extremities  NEUROLOGICAL: No weakness or numbness      Medications:      Allergies:    No Known Allergies    Current Meds:   Scheduled Meds:    amLODIPine  10 mg Oral Daily    ipratropium-albuterol  1 ampule Inhalation Q4H WA    carvedilol  6.25 mg Oral BID WC    enoxaparin  1 mg/kg Subcutaneous BID    aspirin  81 mg Oral Daily    sodium chloride flush  10 mL Intravenous 2 times per day     Continuous Infusions:     PRN Meds: metoprolol, acetaminophen, sodium chloride flush    Data:     Past Medical History:   has a past medical history of Overdose. Social History:   reports that he has never smoked. He has never used smokeless tobacco. He reports previous alcohol use. He reports current drug use. Drug: Other-see comments. Family History:   History reviewed. No pertinent family history. Vitals:  BP (!) 143/82   Pulse 87   Temp 98.7 °F (37.1 °C) (Oral)   Resp 18   Ht 5' 6\" (1.676 m)   Wt 183 lb 10.3 oz (83.3 kg)   SpO2 94%   BMI 29.64 kg/m²   Temp (24hrs), Av.2 °F (36.8 °C), Min:97.8 °F (36.6 °C), Max:98.7 °F (37.1 °C)      No results for input(s): POCGLU in the last 72 hours. I/O(24Hr):     Intake/Output Summary (Last 24 hours) at 2020 0906  Last data filed at 2020 9392  Gross per 24 hour   Intake 240 ml   Output 1675 ml   Net -1435 ml       Labs:    CBC with Differential:    Lab Results   Component Value Date    WBC 4.5 2020    RBC 3.92 2020    HGB 14.2 2020    HCT 40.2 2020     2020    .4 2020    MCH 36.1 2020    MCHC 35.2 2020    RDW 13.0 2020    LYMPHOPCT 24 2020    MONOPCT 9 2020    BASOPCT 2 2020    MONOSABS 0.41 2020    LYMPHSABS 1.08 2020    EOSABS 0.09 2020    BASOSABS 0.09 2020    DIFFTYPE NOT REPORTED 2020       No results found for: SPECIAL  No results found for: CULTURE      Radiology:    Xr Chest Portable    Result Date: 2020  EXAMINATION: ONE XRAY VIEW OF THE CHEST 2020 6:51 am COMPARISON: 2020 HISTORY: ORDERING SYSTEM PROVIDED HISTORY: dyspnea TECHNOLOGIST PROVIDED HISTORY: dyspnea Reason for Exam: dyspnea Acuity: Acute Type of Exam: Subsequent/Follow-up Additional signs and symptoms: dyspnea FINDINGS: The cardiomediastinal silhouette is unchanged in appearance. Persistent elevation of the left hemidiaphragm and associated compressive atelectasis. There is no consolidation, pneumothorax, or evidence of edema. No effusion is appreciated. The osseous structures are unchanged in appearance. Unchanged appearance of the chest without acute airspace disease identified. Xr Chest Portable    Result Date: 8/27/2020  EXAMINATION: ONE XRAY VIEW OF THE CHEST 8/27/2020 1:44 pm COMPARISON: None. HISTORY: ORDERING SYSTEM PROVIDED HISTORY: cough TECHNOLOGIST PROVIDED HISTORY: cough Reason for Exam: cough, congestion Acuity: Acute Type of Exam: Initial FINDINGS: The cardiopericardial silhouette is mildly enlarged. There is mild age-indeterminate, asymmetric elevation of the left hemidiaphragm with mild adjacent streaky left basilar density. Otherwise clear lungs. There is no significant pleural or mediastinal finding     Mild streaky left basilar atelectasis versus fibrosis Mild cardiomegaly         Physical Examination:        PHYSICAL EXAM:  General Appearance  Alert , awake, oriented x3, not in acute distress. He is resting comfortably at the bedside. HEENT - Head is normocephalic, atraumatic. Lungs - Bilateral equal air entry, no wheezes compared to yesterday, rales or rhonchi, aeration good. He is able to converse in full sentence without difficulty. He is no longer wearing nasal cannula compared to yesterday, breathing room air. Cardiovascular - Heart sounds are normal.  Regular rhythm, normal rate without murmur, gallop or rub.   Abdomen - Soft, nontender, nondistended, no masses or organomegaly  Neurologic - There are no new focal motor or sensory deficits  Skin - No bruising or bleeding on exposed skin area  Extremities - No cyanosis, clubbing or edema      Assessment:        Primary Problem  Acute respiratory failure with hypoxia and hypercapnia [facility])    Active Hospital Problems    Diagnosis Date Noted   

## 2020-08-30 NOTE — CARE COORDINATION
ONGOING DISCHARGE PLAN:    Spoke with patient regarding discharge plan and patient confirms that plan is still to return home with no needs. Patient declines info for substance rehab. Cardiac cath scheduled tomorrow. Will need outpatient PFTs and sleep study. Per PT, no skilled needs identified. Will continue to follow for additional discharge needs.     Electronically signed by Kathy Velasquez RN on 8/30/2020 at 3:39 PM

## 2020-08-30 NOTE — PROGRESS NOTES
Pt who was sleeping was awakened and provided scheduled tx. Pt reports that the doctor told him that he didn't need to wear the bipap tonight. I explained that it wasn't specified that way in the notes and his CXR findings still reveal atelectasis. Pt consented to bipap for overnight, but would like to wait until 11pm. Pt reported the mask was irritating his nose. I offered to provide a different style mask. The pt would like to try the new mask. Pt is without resp distress and on room air at this time with SpO2 of 94%.

## 2020-08-30 NOTE — PROGRESS NOTES
Pulmonary Progress Note  [facility]      Patient - Kerrie Ackerman,  Age - 54 y.o.    - 1975      Room Number - 2121/2121-01   MRN -  [id number]   Acct # - [de-identified]  Date of Admission -  2020 12:44 PM        Consulting Michael Cleary MD  Primary Care Physician - No primary care provider on file. SUBJECTIVE   Looks very comfortable on room air    OBJECTIVE   VITALS    height is 5' 6\" (1.676 m) and weight is 183 lb 10.3 oz (83.3 kg). His oral temperature is 98.6 °F (37 °C). His blood pressure is 127/76 and his pulse is 74. His respiration is 16 and oxygen saturation is 99%. Body mass index is 29.64 kg/m². Temperature Range: Temp: 98.6 °F (37 °C) Temp  Av.4 °F (36.9 °C)  Min: 98.1 °F (36.7 °C)  Max: 98.7 °F (37.1 °C)  BP Range:  Systolic (69NHH), INS:545 , Min:127 , BQT:881     Diastolic (82XSI), DORA:57, Min:71, Max:83    Pulse Range: Pulse  Av.6  Min: 70  Max: 97  Respiration Range: Resp  Av.5  Min: 16  Max: 20  Current Pulse Ox[de-identified]  SpO2: 99 %  24HR Pulse Ox Range:  SpO2  Av.6 %  Min: 93 %  Max: 99 %  Oxygen Amount and Delivery: O2 Flow Rate (L/min): 2 L/min    Wt Readings from Last 3 Encounters:   20 183 lb 10.3 oz (83.3 kg)       I/O (24 Hours)    Intake/Output Summary (Last 24 hours) at 2020 1518  Last data filed at 2020 0702  Gross per 24 hour   Intake 240 ml   Output 1675 ml   Net -1435 ml       EXAM     General Appearance  Awake, alert, oriented, in no acute distress  HEENT - normocephalic, atraumatic.  []  Mallampati  [x] Crowded airway   [x] Macroglossia  []  Retrognathia  [] Micrognathia  []  Normal tongue size []  Normal Bite  [] Florencio sign positive    Neck - Supple,  trachea midline   Lungs -clear  Cardiovascular - Heart sounds are normal.  Regular rate and rhythm   Abdomen - Soft, nontender, nondistended, no masses or organomegaly  Neurologic - There are

## 2020-08-30 NOTE — PROGRESS NOTES
Writer found pt sleeping on room air. Pt returned to hospital bipap with previously documented settings. Pt tolerating at this time.

## 2020-08-31 ENCOUNTER — HOSPITAL ENCOUNTER (OUTPATIENT)
Dept: CARDIAC CATH/INVASIVE PROCEDURES | Age: 45
Discharge: HOME | End: 2020-08-31
Payer: MEDICAID

## 2020-08-31 VITALS
HEART RATE: 73 BPM | SYSTOLIC BLOOD PRESSURE: 138 MMHG | OXYGEN SATURATION: 96 % | DIASTOLIC BLOOD PRESSURE: 73 MMHG | RESPIRATION RATE: 17 BRPM

## 2020-08-31 VITALS
RESPIRATION RATE: 16 BRPM | HEART RATE: 63 BPM | OXYGEN SATURATION: 98 % | DIASTOLIC BLOOD PRESSURE: 106 MMHG | BODY MASS INDEX: 28.98 KG/M2 | WEIGHT: 180.34 LBS | TEMPERATURE: 97.6 F | SYSTOLIC BLOOD PRESSURE: 135 MMHG | HEIGHT: 66 IN

## 2020-08-31 LAB
ABSOLUTE EOS #: 0.04 K/UL (ref 0–0.4)
ABSOLUTE IMMATURE GRANULOCYTE: ABNORMAL K/UL (ref 0–0.3)
ABSOLUTE LYMPH #: 1.68 K/UL (ref 1–4.8)
ABSOLUTE MONO #: 0.7 K/UL (ref 0.1–1.3)
ANION GAP SERPL CALCULATED.3IONS-SCNC: 12 MMOL/L (ref 9–17)
ANTI-NUCLEAR ANTIBODY (ANA): NEGATIVE
BASOPHILS # BLD: 0 % (ref 0–2)
BASOPHILS ABSOLUTE: 0 K/UL (ref 0–0.2)
BUN BLDV-MCNC: 6 MG/DL (ref 6–20)
BUN/CREAT BLD: ABNORMAL (ref 9–20)
CALCIUM SERPL-MCNC: 9 MG/DL (ref 8.6–10.4)
CHLORIDE BLD-SCNC: 102 MMOL/L (ref 98–107)
CO2: 24 MMOL/L (ref 20–31)
CREAT SERPL-MCNC: 0.56 MG/DL (ref 0.7–1.2)
DIFFERENTIAL TYPE: ABNORMAL
EOSINOPHILS RELATIVE PERCENT: 1 % (ref 0–4)
FOLATE: 11.2 NG/ML
GFR AFRICAN AMERICAN: >60 ML/MIN
GFR NON-AFRICAN AMERICAN: >60 ML/MIN
GFR SERPL CREATININE-BSD FRML MDRD: ABNORMAL ML/MIN/{1.73_M2}
GFR SERPL CREATININE-BSD FRML MDRD: ABNORMAL ML/MIN/{1.73_M2}
GLUCOSE BLD-MCNC: 113 MG/DL (ref 70–99)
HCT VFR BLD CALC: 42.9 % (ref 41–53)
HEMOGLOBIN: 15.1 G/DL (ref 13.5–17.5)
IMMATURE GRANULOCYTES: ABNORMAL %
LYMPHOCYTES # BLD: 41 % (ref 24–44)
MCH RBC QN AUTO: 36.2 PG (ref 26–34)
MCHC RBC AUTO-ENTMCNC: 35.1 G/DL (ref 31–37)
MCV RBC AUTO: 103 FL (ref 80–100)
MONOCYTES # BLD: 17 % (ref 1–7)
MORPHOLOGY: NORMAL
NRBC AUTOMATED: ABNORMAL PER 100 WBC
PDW BLD-RTO: 13.1 % (ref 11.5–14.9)
PLATELET # BLD: 232 K/UL (ref 150–450)
PLATELET ESTIMATE: ABNORMAL
PMV BLD AUTO: 8.8 FL (ref 6–12)
POTASSIUM SERPL-SCNC: 3.8 MMOL/L (ref 3.7–5.3)
RBC # BLD: 4.17 M/UL (ref 4.5–5.9)
RBC # BLD: ABNORMAL 10*6/UL
SEG NEUTROPHILS: 41 % (ref 36–66)
SEGMENTED NEUTROPHILS ABSOLUTE COUNT: 1.68 K/UL (ref 1.3–9.1)
SODIUM BLD-SCNC: 138 MMOL/L (ref 135–144)
VITAMIN B-12: 372 PG/ML (ref 232–1245)
WBC # BLD: 4.1 K/UL (ref 3.5–11)
WBC # BLD: ABNORMAL 10*3/UL

## 2020-08-31 PROCEDURE — 94660 CPAP INITIATION&MGMT: CPT

## 2020-08-31 PROCEDURE — 94761 N-INVAS EAR/PLS OXIMETRY MLT: CPT

## 2020-08-31 PROCEDURE — 6360000004 HC RX CONTRAST MEDICATION

## 2020-08-31 PROCEDURE — 93458 L HRT ARTERY/VENTRICLE ANGIO: CPT | Performed by: INTERNAL MEDICINE

## 2020-08-31 PROCEDURE — 99239 HOSP IP/OBS DSCHRG MGMT >30: CPT | Performed by: INTERNAL MEDICINE

## 2020-08-31 PROCEDURE — 80048 BASIC METABOLIC PNL TOTAL CA: CPT

## 2020-08-31 PROCEDURE — 82607 VITAMIN B-12: CPT

## 2020-08-31 PROCEDURE — 94640 AIRWAY INHALATION TREATMENT: CPT

## 2020-08-31 PROCEDURE — 2500000003 HC RX 250 WO HCPCS

## 2020-08-31 PROCEDURE — 7100000011 HC PHASE II RECOVERY - ADDTL 15 MIN

## 2020-08-31 PROCEDURE — 2709999900 HC NON-CHARGEABLE SUPPLY

## 2020-08-31 PROCEDURE — 6370000000 HC RX 637 (ALT 250 FOR IP): Performed by: INTERNAL MEDICINE

## 2020-08-31 PROCEDURE — C1769 GUIDE WIRE: HCPCS

## 2020-08-31 PROCEDURE — C1725 CATH, TRANSLUMIN NON-LASER: HCPCS

## 2020-08-31 PROCEDURE — 6360000002 HC RX W HCPCS

## 2020-08-31 PROCEDURE — 36415 COLL VENOUS BLD VENIPUNCTURE: CPT

## 2020-08-31 PROCEDURE — 2580000003 HC RX 258: Performed by: INTERNAL MEDICINE

## 2020-08-31 PROCEDURE — 7100000010 HC PHASE II RECOVERY - FIRST 15 MIN

## 2020-08-31 PROCEDURE — 82746 ASSAY OF FOLIC ACID SERUM: CPT

## 2020-08-31 PROCEDURE — C1894 INTRO/SHEATH, NON-LASER: HCPCS

## 2020-08-31 PROCEDURE — 85025 COMPLETE CBC W/AUTO DIFF WBC: CPT

## 2020-08-31 PROCEDURE — 6370000000 HC RX 637 (ALT 250 FOR IP): Performed by: STUDENT IN AN ORGANIZED HEALTH CARE EDUCATION/TRAINING PROGRAM

## 2020-08-31 RX ORDER — SODIUM CHLORIDE 9 MG/ML
INJECTION, SOLUTION INTRAVENOUS CONTINUOUS
Status: DISCONTINUED | OUTPATIENT
Start: 2020-08-31 | End: 2020-09-01 | Stop reason: HOSPADM

## 2020-08-31 RX ORDER — SODIUM CHLORIDE 0.9 % (FLUSH) 0.9 %
10 SYRINGE (ML) INJECTION EVERY 12 HOURS SCHEDULED
Status: CANCELLED | OUTPATIENT
Start: 2020-08-31

## 2020-08-31 RX ORDER — SODIUM CHLORIDE 0.9 % (FLUSH) 0.9 %
10 SYRINGE (ML) INJECTION PRN
Status: CANCELLED | OUTPATIENT
Start: 2020-08-31

## 2020-08-31 RX ORDER — ATORVASTATIN CALCIUM 40 MG/1
40 TABLET, FILM COATED ORAL DAILY
Qty: 30 TABLET | Refills: 3 | Status: SHIPPED | OUTPATIENT
Start: 2020-08-31

## 2020-08-31 RX ORDER — ACETAMINOPHEN 325 MG/1
650 TABLET ORAL EVERY 4 HOURS PRN
Status: CANCELLED | OUTPATIENT
Start: 2020-08-31

## 2020-08-31 RX ORDER — ASPIRIN 81 MG/1
81 TABLET, CHEWABLE ORAL DAILY
Qty: 30 TABLET | Refills: 3 | Status: SHIPPED | OUTPATIENT
Start: 2020-09-01

## 2020-08-31 RX ORDER — AMLODIPINE BESYLATE 10 MG/1
10 TABLET ORAL DAILY
Qty: 30 TABLET | Refills: 3 | Status: SHIPPED | OUTPATIENT
Start: 2020-09-01

## 2020-08-31 RX ORDER — CARVEDILOL 6.25 MG/1
6.25 TABLET ORAL 2 TIMES DAILY WITH MEALS
Qty: 60 TABLET | Refills: 3 | Status: SHIPPED | OUTPATIENT
Start: 2020-08-31

## 2020-08-31 RX ADMIN — IPRATROPIUM BROMIDE AND ALBUTEROL SULFATE 1 AMPULE: .5; 3 SOLUTION RESPIRATORY (INHALATION) at 10:54

## 2020-08-31 RX ADMIN — CARVEDILOL 6.25 MG: 6.25 TABLET, FILM COATED ORAL at 18:29

## 2020-08-31 RX ADMIN — SODIUM CHLORIDE: 9 INJECTION, SOLUTION INTRAVENOUS at 12:16

## 2020-08-31 RX ADMIN — AMLODIPINE BESYLATE 10 MG: 10 TABLET ORAL at 08:44

## 2020-08-31 RX ADMIN — ASPIRIN 81 MG 81 MG: 81 TABLET ORAL at 08:44

## 2020-08-31 RX ADMIN — CARVEDILOL 6.25 MG: 6.25 TABLET, FILM COATED ORAL at 08:44

## 2020-08-31 RX ADMIN — Medication 10 ML: at 10:01

## 2020-08-31 RX ADMIN — IPRATROPIUM BROMIDE AND ALBUTEROL SULFATE 1 AMPULE: .5; 3 SOLUTION RESPIRATORY (INHALATION) at 06:52

## 2020-08-31 NOTE — PROGRESS NOTES
Pt is alert and oriented stating he is up for the day. Bipap is now off and pt is comfortably 96% O2 on room air. Bipap placed in standby.

## 2020-08-31 NOTE — OP NOTE
modification      ____________________________________________________________________    History and Risk Factors    [] Hypertension     [x] Family history of CAD  [] Hyperlipidemia     [] Cerebrovascular Disease   [] Prior MI       [] Peripheral Vascular disease   [] Prior PCI              [] Diabetes Mellitus    [] Left Main PCI. [] Currently on Dialysis. [] Prior CABG. [x] Currently smoker. [] Cardiac Arrest outside of healthcare facility. [] Yes    [] No        Witnessed     [] Yes   [] No     Arrest after arrival of EMS  [] Yes   [] No     [] Cardiac Arrest at other Facility. [] Yes   [] No    Pre-Procedure Information. Heart Failure       [] Yes    [x] No        Class  [] I      [] II  [] III    [] IV. New Diagnosis    [] Yes  [] No    HF Type      [] Systolic   [] Diastolic          [] Unknown. Diagnostic Test:   EKG       [x] Normal   [] Abnormal    New antiarrhythmia medications:    [] Yes   [] No   New onset atrial fibrillation / Flutter     [] Yes   [] No   ECG Abnormalities:      [] V. Fib   [] Ayesha V. Tach           [] NS V. T   [] New LBBB           [] T. Inv  []  ST dev > 0.5 mm         [] PVC's freq  [] PVC's infrequent    Stress Test Performed:      [] Yes    [x] No     Type:     [] Stress Echo   [] Exercise Stress Test (no imaging)      [] Stress Nuclear  [] Stress Imaging     Results   [] Negative   [] Positive        [] Indeterminate  [] Unavailable     If Positive/ Risk / Extent of Ischemia:       [] Low  [] Intermediate         [] High  [] Unavailable      Cardiac CTA Performed:     [] Yes    [x] No      Results   [] CAD   [] Non obstructive CAD      [] No CAD   [] Uncertain      [] Unknown   [] Structural Disease.      Pre Procedure Medications:   [] Yes    [x] No         [] ASA   [] Beta Blockers      [] Nitrate   [] Ca Channel Blockers      [] Ranolazine   [] Statin       [] Plavix/Others antiplatelets      Electronically signed on 8/31/2020 at 3:34 PM by:    Maryjo Louie Antonio Paz MD  Fellow, [address]      I have reviewed the case / procedure with resident / fellow  I have examined the patient personally  Patient agree with treatment plan, correction innotes was made as appropriate, and discussed final arrangement based on  my evaluation and exam.    Risk and benefit of procedure if planned were explained in details. Procedure was performed by me, with all aspect of the procedure being done using standard protocol. Note was modified based on my own assessment and treatment.     Alfreda Cartwright MD  [facility]

## 2020-08-31 NOTE — PROGRESS NOTES
Report called to [phone] [facility].  Transportation arranged

## 2020-08-31 NOTE — PROGRESS NOTES
Pt called out asking to take Bipap mask off to drink something before his NPO status at midnight. Rn reminded pt he will be nothing by mouth after midnight and we will need to put the bipap mask back on if he plans to go back to sleep after. Pt stated understanding. RN notified [facility] [place] as well.

## 2020-08-31 NOTE — PLAN OF CARE
Problem: Falls - Risk of:  Goal: Will remain free from falls  Description: Will remain free from falls  8/31/2020 0318 by Tan Kumar RN  Outcome: Ongoing  Note: Pt remains free from falls this shift. Pt is up per self. Steady gait noted on assessment. Calls appropriately. Will continue to monitor for changes. Problem: Falls - Risk of:  Goal: Absence of physical injury  Description: Absence of physical injury  8/31/2020 0318 by Tan Kumar RN  Outcome: Ongoing     Problem: Breathing Pattern - Ineffective:  Goal: Ability to achieve and maintain a regular respiratory rate will improve  Description: Ability to achieve and maintain a regular respiratory rate will improve  8/31/2020 0318 by Tan Kumar RN  Outcome: Ongoing  Note: Pt rests comfortably on bipap throughout night. While awake, pt is comfortable on room air with no dyspnea or hypoxia noted.       Problem: Safety:  Goal: Free from accidental physical injury  Description: Free from accidental physical injury  8/31/2020 0318 by Tan Kumar RN  Outcome: Ongoing     Problem: Safety:  Goal: Free from intentional harm  Description: Free from intentional harm  8/31/2020 0318 by Tan Kumar RN  Outcome: Ongoing     Problem: Daily Care:  Goal: Daily care needs are met  Description: Daily care needs are met  8/31/2020 0318 by Tan Kumar RN  Outcome: Ongoing     Problem: Pain:  Goal: Patient's pain/discomfort is manageable  Description: Patient's pain/discomfort is manageable  8/31/2020 0318 by Tan Kumar RN  Outcome: Ongoing     Problem: Discharge Planning:  Goal: Patients continuum of care needs are met  Description: Patients continuum of care needs are met  8/31/2020 0318 by Tan Kumar RN  Outcome: Ongoing     Problem: Gas Exchange - Impaired:  Goal: Levels of oxygenation will improve  Description: Levels of oxygenation will improve  8/31/2020 0318 by Tan Kumar RN  Outcome: Ongoing     Problem: Skin Integrity:  Goal: Will show no infection signs and symptoms  Description: Will show no infection signs and symptoms  8/31/2020 0318 by Júnior Tejeda RN  Outcome: Ongoing     Problem: Skin Integrity:  Goal: Absence of new skin breakdown  Description: Absence of new skin breakdown  8/31/2020 0318 by Júnior Tejeda RN  Outcome: Ongoing  Note: No new skin issues this shift. Pt turns self. Will continue to monitor for changes.

## 2020-08-31 NOTE — PROGRESS NOTES
[facility]   Progress Note                   Date:   8/31/2020  Patient name: Son Salamanca  Date of admission:  8/27/2020 12:44 PM  MRN:   [id number]  YOB: 1975  PCP: No primary care provider on file. Reason for Admission: AMS    Subjective:   Patient was seen and assessed at bedside this morning. No issues overnight. This morning patient denies chest pain, palpitations, dizziness, headaches, dyspnea. Patient is comfortable. Intake/Output Summary (Last 24 hours) at 8/31/2020 1102  Last data filed at 8/31/2020 0024  Gross per 24 hour   Intake 200 ml   Output --   Net 200 ml       Medications:   Scheduled Meds:   amLODIPine  10 mg Oral Daily    ipratropium-albuterol  1 ampule Inhalation Q4H WA    carvedilol  6.25 mg Oral BID WC    aspirin  81 mg Oral Daily    sodium chloride flush  10 mL Intravenous 2 times per day     Continuous Infusions:  CBC:   Recent Labs     08/29/20  0408 08/30/20  0425 08/31/20  0428   WBC 5.1 4.5 4.1   HGB 13.8 14.2 15.1    215 232     BMP:    Recent Labs     08/29/20  0408 08/29/20  1701 08/30/20  0425 08/31/20  0428     --  136 138   K 5.5* 4.2 4.4 3.8     --  101 102   CO2 25  --  28 24   BUN 15  --  6 6   CREATININE 0.69*  --  0.57* 0.56*   GLUCOSE 81  --  135* 113*     Hepatic: No results for input(s): AST, ALT, ALB, BILITOT, ALKPHOS in the last 72 hours. Troponin: No results for input(s): TROPONINI in the last 72 hours. BNP: No results for input(s): BNP in the last 72 hours. Lipids: No results for input(s): CHOL, HDL in the last 72 hours. Invalid input(s): LDLCALCU  INR: No results for input(s): INR in the last 72 hours.     Objective:   Vitals: BP (!) 135/106   Pulse 63   Temp 97.6 °F (36.4 °C) (Axillary)   Resp 16   Ht 5' 6\" (1.676 m)   Wt 180 lb 5.4 oz (81.8 kg)   SpO2 98%   BMI 29.11 kg/m²   Constitutional and General Appearance: alert, cooperative, no distress and appears stated age    Respiratory:  · Clear to auscultation bilaterally, with equal air entry  Cardiovascular:  · S1, s2, rrr, no pain on palpation of anterior chest wall. Abdomen:   · No masses or tenderness, soft abdomen  · Bowel sounds present  Extremities:  · No le edema, peripheral pulse bl le present 2 +  Integumentum:   Intact with no rashes noted      2D ECHO ( 8/28/20)  Echo contrast utilized on this technically difficult study. Small LV cavity. Estimated LV EF 50-55 %. Trivial mitral regurgitation. No significant pericardial effusion is seen.         Assessment / Acute Cardiac Problems:   1. NSTEMI  2. Hypertension with preserved LVEF  3. Sinus tachycardia, resolved  4. Acute respiratory failure, improved  5. Drug overdose  6. Alcoholism    Patient Active Problem List:     Hypoxia     Overdose     Acute respiratory failure with hypoxia and hypercapnia (HCC)     Respiratory acidosis     NSTEMI (non-ST elevated myocardial infarction) (HCC)     Other emphysema (HCC)     Hypertension     Substance abuse ([facility] [address]     ACS (acute coronary syndrome) ([facility])     JAY (acute kidney injury) ([facility] [address]     [facility] and [facility]      Plan of Treatment:   1. Cardiac cath today at [facility]  2. Continue carvedilol 6.25 mg twice daily  3. Continue amlodipine 10 mg po daily  4. ASA 81mg daily  5. Lopressor 5 mg PRN for SBP >160 or DBP > 100     Will discuss with rounding attending Dr. Layne Abbott for final recommendations. Tessa Baca MD  PGY2 Family Medicine Resident      Attending Physician Statement  I have discussed the care of Giovanna Coon, including pertinent history and exam findings,  with the cardiology fellow/resident. I have seen and examined the patient and the key elements of all parts of the encounter have been performed by me.   I agree with the assessment, plan and orders as documented by the resident with additional recommendations as below:       Patient was seen and examined, feeling better, alert and oriented. Denies any chest pain. He is scheduled for left heart catheterization at [facility] today due to mild troponin elevation with an upward trend. I discussed again risks, benefits and alternatives of the procedure and he verbalized understanding and willing to proceed. Further recommendations post left heart cath. Continue Norvasc and Coreg for blood pressure control.         José Manuel Cardenas MD, [facility]

## 2020-08-31 NOTE — PROGRESS NOTES
Pt placed on hospital bipap set to 10/6, back up RR 16 and 28% O2 with a large total mask. Pt tolerating well at this time. Mask and alarms appropriate.

## 2020-08-31 NOTE — CARE COORDINATION
ONGOING DISCHARGE PLAN:    Reviewed patients chart regarding discharge plan and chart still confirms the plan is to discharge to home with no needs  Patient will have a heart cath done today       Will review plan with patient and or family      Will continue to follow for additional discharge needs.      Electronically signed by Roderick Whatley RN on 8/31/2020 at 2:43 PM

## 2020-08-31 NOTE — PROGRESS NOTES
Received post procedure to [facility] to room 6. Assessment obtained. Restrictions reviewed with patient. Post procedure pathway initiated. Right radial site soft VAS, band  dry and intact. No hematoma noted. .  Patient without complaints.

## 2020-08-31 NOTE — PROGRESS NOTES
Pt continues to tolerate hospital bipap. Pt volumes were <400mL due to finally sleeping. IPAP titrated up to [id number].

## 2020-08-31 NOTE — PROGRESS NOTES
[facility]   Progress Note                   Date:   8/30/20  Patient name: Tigist Thomas  Date of admission:  8/27/2020 12:44 PM  MRN:   [id number]  YOB: 1975  PCP: No primary care provider on file. Reason for Admission:     Subjective:       Clinical Changes / Abnormalities:  Transferred out of ICU yesterday; up in chair and ambulating; denies CP, SOB or lightheadedness      Medications:   Scheduled Meds:   amLODIPine  10 mg Oral Daily    ipratropium-albuterol  1 ampule Inhalation Q4H WA    carvedilol  6.25 mg Oral BID WC    aspirin  81 mg Oral Daily    sodium chloride flush  10 mL Intravenous 2 times per day     Continuous Infusions:  CBC:   Recent Labs     08/28/20  0334 08/29/20  0408 08/30/20  0425   WBC 8.1 5.1 4.5   HGB 15.0 13.8 14.2    194 215     BMP:    Recent Labs     08/28/20  0334 08/29/20  0408 08/29/20  1701 08/30/20  0425    136  --  136   K 4.8 5.5* 4.2 4.4   * 103  --  101   CO2 22 25  --  28   BUN 17 15  --  6   CREATININE 0.82 0.69*  --  0.57*   GLUCOSE 83 81  --  135*     Hepatic: No results for input(s): AST, ALT, ALB, BILITOT, ALKPHOS in the last 72 hours. Troponin: No results for input(s): TROPONINI in the last 72 hours. BNP: No results for input(s): BNP in the last 72 hours. Lipids: No results for input(s): CHOL, HDL in the last 72 hours. Invalid input(s): LDLCALCU  INR: No results for input(s): INR in the last 72 hours. Objective:   Vitals: BP (!) 135/106   Pulse 63   Temp 97.6 °F (36.4 °C) (Axillary)   Resp 17   Ht 5' 6\" (1.676 m)   Wt 183 lb 10.3 oz (83.3 kg)   SpO2 99%   BMI 29.64 kg/m²   General appearance: alert and cooperative with exam  HEENT: Head: Normocephalic, no lesions, without obvious abnormality.   Neck: no adenopathy, no carotid bruit, no JVD, supple, symmetrical, trachea midline and thyroid not enlarged, symmetric, no tenderness/mass/nodules  Lungs: clear to auscultation bilaterally  Heart: regular rate and rhythm, S1, S2 normal, no murmur, click, rub or gallop  Abdomen: soft, non-tender; bowel sounds normal; no masses,  no organomegaly  Extremities: extremities normal, atraumatic, no cyanosis or edema  Neurologic: Mental status: Alert, oriented, thought content appropriate    2D ECHO ( 8/28/20)  Echo contrast utilized on this technically difficult study. Small LV cavity. Estimated LV EF 50-55 %. Trivial mitral regurgitation. No significant pericardial effusion is seen. Assessment / Acute Cardiac Problems:   1. NSTEMI  2. Hypertension with preserved LVEF  3. Sinus tachycardia, resolved  4. Acute respiratory failure, improved  5. Drug overdose  6. Alcoholism    Patient Active Problem List:     Hypoxia     Overdose     Acute respiratory failure with hypoxia and hypercapnia (HCC)     Respiratory acidosis     NSTEMI (non-ST elevated myocardial infarction) (HCC)     Other emphysema (HCC)     Hypertension     Substance abuse ([facility] [address]     ACS (acute coronary syndrome) ([facility]     JAY (acute kidney injury) ([facility] [address]     TERELL and COPD overlap syndrome ([facility] [address]      Plan of Treatment:   1. Continue carvedilol 6.25 mg twice daily  2. Continue amlodipine 10 mg po daily  3. ASA 81mg daily  4. Lopressor 5 mg PRN for SBP >160 or DBP > 100   5.  Cardiac catheterization anticipated for Monday morning 8/31/20 at [facility] ( last dose of Lovenox AM 8/30)    Electronically signed by Emily German MD on 8/31/2020 at 2:58 [address] [facility]  [phone]

## 2020-08-31 NOTE — PROGRESS NOTES
[address]   OCCUPATIONAL THERAPY  TREATMENT NOTE   INPATIENT   Date: 20  Patient Name: Eddie Iniguez       Room:   MRN: [id number]   Account #: [de-identified]    : 1975  (54 y.o.)  Gender: male      REASON FOR MISSED TREATMENT:   Home  20 No Skilled OT Needs; going for Cardiac Cath today (DM)  -   OT being discontinued at this time.  Patient functioning at Premorbid Level  No further needs        Marcus Maria OT

## 2020-08-31 NOTE — PROGRESS NOTES
[facility] at bedside, picking patient up for transfer to [facility]. [facility] for cardiac cath.

## 2020-08-31 NOTE — DISCHARGE SUMMARY
[place] [address] Internal Medicine    Discharge Summary     Patient ID: Javi Wang  :  1975   MRN: [id number]     ACCOUNT:  [de-identified]   Patient's PCP: No primary care provider on file. Admit Date: 2020   Discharge Date: 2020   Length of Stay: 4  Code Status:  Full Code  Admitting Physician: Ken Thayer MD  Discharge Physician: Boy Noble MD     Active Discharge Diagnoses:     Primary Problem  Acute respiratory failure with hypoxia and hypercapnia [facility])      [facility] Problems    Diagnosis Date Noted    TERELL and COPD overlap syndrome ([facility] [G47.33, J44.9] 2020    Hypertension [I10] 2020    Substance abuse ([facility] [F19.10] 2020    ACS (acute coronary syndrome) ([facility] [I24.9] 2020    JAY (acute kidney injury) ([facility] [N17.9] 2020    Overdose [T50.901A]     Acute respiratory failure with hypoxia and hypercapnia ([facility] [J96.01, J96.02]     Respiratory acidosis [E87.2]     NSTEMI (non-ST elevated myocardial infarction) ([facility] [I21.4]     Other emphysema ([facility] [J43.8]     Hypoxia [R09.02] 2020       Admission Condition:  poor     Discharged Condition: fair    Hospital Stay:     Hospital Course:  Javi Wang is a 54 y.o. male who was admitted for the management of   Acute respiratory failure with hypoxia and hypercapnia ([facility] , presented to ER with Drug Overdose  Patient was admitted with found down , he had Pin pointed pupils, his urine drug screen was negative  He has elevated, troponin, will treat with cardiologist, underwent cardiac cath which was negative  Getting discharged on aspirin, Coreg, Lipitor.   Patient had elevated blood pressures, started Norvasc  Patient is getting discharged and will follow us in  office      Significant therapeutic interventions:     Significant Diagnostic Studies:   Labs / Micro:  {  Radiology:    Xr Chest Portable    Result Date: 2020  EXAMINATION: ONE XRAY VIEW OF THE CHEST 8/29/2020 6:51 am COMPARISON: 08/27/2020 HISTORY: ORDERING SYSTEM PROVIDED HISTORY: dyspnea TECHNOLOGIST PROVIDED HISTORY: dyspnea Reason for Exam: dyspnea Acuity: Acute Type of Exam: Subsequent/Follow-up Additional signs and symptoms: dyspnea FINDINGS: The cardiomediastinal silhouette is unchanged in appearance. Persistent elevation of the left hemidiaphragm and associated compressive atelectasis. There is no consolidation, pneumothorax, or evidence of edema. No effusion is appreciated. The osseous structures are unchanged in appearance. Unchanged appearance of the chest without acute airspace disease identified. Xr Chest Portable    Result Date: 8/27/2020  EXAMINATION: ONE XRAY VIEW OF THE CHEST 8/27/2020 1:44 pm COMPARISON: None. HISTORY: ORDERING SYSTEM PROVIDED HISTORY: cough TECHNOLOGIST PROVIDED HISTORY: cough Reason for Exam: cough, congestion Acuity: Acute Type of Exam: Initial FINDINGS: The cardiopericardial silhouette is mildly enlarged. There is mild age-indeterminate, asymmetric elevation of the left hemidiaphragm with mild adjacent streaky left basilar density. Otherwise clear lungs. There is no significant pleural or mediastinal finding     Mild streaky left basilar atelectasis versus fibrosis Mild cardiomegaly         Consultations:    Consults:     Final Specialist Recommendations/Findings:   IP CONSULT TO SOCIAL WORK  IP CONSULT TO PULMONOLOGY  IP CONSULT TO SOCIAL WORK  IP CONSULT TO CARDIOLOGY      The patient was seen and examined on day of discharge and this discharge summary is in conjunction with any daily progress note from day of discharge. Discharge plan:     Disposition: Home    Physician Follow Up:     No follow-up provider specified.      Requiring Further Evaluation/Follow Up POST HOSPITALIZATION/Incidental Findings:     Diet: cardiac diet    Activity: As tolerated    Instructions to Patient:     Discharge Medications:      Medication List      START

## 2020-09-03 NOTE — PROGRESS NOTES
Physician Progress Note      PATIENT:               Han Carlton  CSN #:                  [id number]  :                       1975  ADMIT DATE:       2020 12:44 PM  [address] DATE:        2020 6:15 PM  RESPONDING  PROVIDER #:        Elise Tinsley MD          QUERY TEXT:    Dear Cardiology and Dr. Poncho Llamas  Patient admitted with Drug overdose. Documentation reflects NSTEMI in note in   H &P. If possible, please document in the progress notes and discharge   summary if NSTEMI was: The medical record reflects the following:  Risk Factors:  age 55 yo , drug overdose, rule out NSTEMI,  Clinical Indicators: Troponin levels up trending (41, 46, 61). Not documented   on d/c plan. Treatment: Consult cardiology , Cardiac catheterization anticipated for Monday   morning 8/31/20 at [address]  Thank you,  Sierra Pa, MSN, CDS  Options provided:  -- NSTEMI ruled out after study  -- NSTEMI treated and resolved  -- Other - I will add my own diagnosis  -- Disagree - Not applicable / Not valid  -- Disagree - Clinically unable to determine / Unknown  -- Refer to Clinical Documentation Reviewer    PROVIDER RESPONSE TEXT:    NSTEMI ruled out after study.     Query created by: Magnolia Bowman on 9/3/2020 8:17 AM      Electronically signed by:  Elise Tinsley MD 9/3/2020 9:17 AM

## 2020-09-21 ENCOUNTER — TELEPHONE (OUTPATIENT)
Dept: INTERNAL MEDICINE CLINIC | Age: 45
End: 2020-09-21

## 2020-10-01 ENCOUNTER — TELEPHONE (OUTPATIENT)
Dept: INTERNAL MEDICINE CLINIC | Age: 45
End: 2020-10-01

## 2020-12-15 NOTE — ED PROVIDER NOTES
Addended by: TERRY BHARDWAJ on: 12/15/2020 02:10 PM     Modules accepted: Orders     EMERGENCY DEPARTMENT ENCOUNTER    Pt Name: Giovanna Coon  MRN: [id number]  [place] 1975  Date of evaluation: 8/27/20  CHIEF COMPLAINT       Chief Complaint   Patient presents with    Drug Overdose     HISTORY OF PRESENT ILLNESS   13-year-old male presents with complaint of drug overdose. Patient was found unresponsive on a front porch of her house with pinpoint pupils. He was given 2 mg of Narcan intranasally and then 6 mg of Narcan IV. After this patient became arousable. On initial exam patient denying complaints at this time, does admit to snorting a white substance today which he thinks was heroin but was not sure. The history is provided by the patient and the EMS personnel. Ingestion   Ingested substance:  Illicit drugs  Illicit drug type: Other (heroin)  Incident location:  Home  Context: recreational    Context: not suicide attempt    Associated symptoms: no abdominal pain, no chest pain, no headaches and no shortness of breath            REVIEW OF SYSTEMS     Review of Systems   Constitutional: Negative for chills and fever. HENT: Negative for congestion and ear pain. Eyes: Negative for pain. Respiratory: Negative for shortness of breath. Cardiovascular: Negative for chest pain, palpitations and leg swelling. Gastrointestinal: Negative for abdominal pain. Genitourinary: Negative for dysuria and flank pain. Musculoskeletal: Negative for back pain. Skin: Negative for color change. Neurological: Negative for numbness and headaches. Psychiatric/Behavioral: Negative for confusion and suicidal ideas. All other systems reviewed and are negative. PASTMEDICAL HISTORY     Past Medical History:   Diagnosis Date    Overdose      Past Problem List  Patient Active Problem List   Diagnosis Code    Hypoxia R09.02     SURGICAL HISTORY     History reviewed. No pertinent surgical history. CURRENT MEDICATIONS       There are no discharge medications for this patient.     ALLERGIES has No Known Allergies. FAMILY HISTORY     has no family status information on file. SOCIAL HISTORY       Social History     Tobacco Use    Smoking status: Not on file   Substance Use Topics    Alcohol use: Not on file    Drug use: Not on file     Comment: Heroin     PHYSICAL EXAM     INITIAL VITALS: BP (!) 127/93   Pulse 108   Temp 98.5 °F (36.9 °C) (Oral)   Resp 12   Ht 5' 6\" (1.676 m)   Wt 178 lb 2.1 oz (80.8 kg)   SpO2 94%   BMI 28.75 kg/m²    Physical Exam  Vitals signs and nursing note reviewed. Constitutional:       Appearance: Normal appearance. HENT:      Head: Normocephalic and atraumatic. Right Ear: External ear normal.      Left Ear: External ear normal.      Nose: Nose normal.      Mouth/Throat:      Mouth: Mucous membranes are moist.   Eyes:      Pupils: Pupils are equal, round, and reactive to light. Neck:      Musculoskeletal: Neck supple. Cardiovascular:      Rate and Rhythm: Regular rhythm. Tachycardia present. Pulses: Normal pulses. Heart sounds: Normal heart sounds. Pulmonary:      Effort: Pulmonary effort is normal.      Breath sounds: Examination of the left-lower field reveals rhonchi. Rhonchi present. Abdominal:      General: Abdomen is flat. Palpations: Abdomen is soft. Tenderness: There is no abdominal tenderness. Musculoskeletal: Normal range of motion. General: No tenderness. Right lower leg: No edema. Left lower leg: No edema. Skin:     General: Skin is warm and dry. Capillary Refill: Capillary refill takes less than 2 seconds. Neurological:      General: No focal deficit present. Mental Status: He is alert and oriented to person, place, and time. Psychiatric:         Behavior: Behavior normal.         MEDICAL DECISION MAKIN-year-old male presents with complaint of suspected overdose.   Will draw basic labs and order chest x-ray, and reevaluate patient    Patient currently on nasal cannula, becoming decreasingly responsive again we will give another 2 mg of Narcan IV. Patient still with decreased responsiveness was given  another 1 mg of Narcan    Patient observed for prolonged time in ED, responsive to verbal stimuli, patient trialed off supplemental O2 multiple times and patient oxygen saturation decreases to mid 80s. Decision made to admit patient for hypoxia and further evaluation/observation    Spoke with Dr. Camron Nunes who accepts admission. Patient demonstrates understanding and agreement with the plan, was given the opportunity to ask questions, and these questions were answered to the best of the provided information at this time. VS stable for transfer. Assessment: Hypoxia, Overdose      PCP: No primary care provider on file. Admitting Physician: Dr. Camron Nunes    This dictation was prepared using "[employer] voice recognition software. As a result, errors may have occurred. When identified, these errors have been corrected. While every attempt is made to correct errors in dictation, errors may still exist.            CRITICAL CARE:       PROCEDURES:    Procedures    DIAGNOSTIC RESULTS   EKG:All EKG's are interpreted by the Emergency Department Physician who either signs or Co-signs this chart in the absence of a cardiologist.  EKG reviewed showing sinus tachycardia with a rate of 122, normal axis, normal intervals, no ST segment changes, no T wave changes      RADIOLOGY:All plain film, CT, MRI, and formal ultrasound images (except ED bedside ultrasound) are read by the radiologist, see reports below, unless otherwisenoted in MDM or here. XR CHEST PORTABLE   Final Result   Mild streaky left basilar atelectasis versus fibrosis      Mild cardiomegaly           LABS: All lab results were reviewed by myself, and all abnormals are listed below.   Labs Reviewed   CBC WITH AUTO DIFFERENTIAL - Abnormal; Notable for the following components:       Result Value    .1 (*)     MCH 35.7 (*) Monocytes 8 (*)     All other components within normal limits   COMPREHENSIVE METABOLIC PANEL W/ REFLEX TO MG FOR LOW K - Abnormal; Notable for the following components:    Glucose 137 (*)     CREATININE 1.27 (*)     Potassium 3.5 (*)     CO2 18 (*)     ALT 59 (*)     AST 48 (*)     Total Bilirubin 0.21 (*)     All other components within normal limits   TROPONIN - Abnormal; Notable for the following components:    Troponin, High Sensitivity 41 (*)     All other components within normal limits   URINALYSIS - Abnormal; Notable for the following components:    Turbidity UA CLOUDY (*)     Protein, UA 1+ (*)     All other components within normal limits   TROPONIN - Abnormal; Notable for the following components:    Troponin, High Sensitivity 46 (*)     All other components within normal limits   MICROSCOPIC URINALYSIS - Abnormal; Notable for the following components:    Mucus, UA 1+ (*)     Amorphous, UA 1+ (*)     All other components within normal limits   MAGNESIUM   URINE DRUG SCREEN   BASIC METABOLIC PANEL W/ REFLEX TO MG FOR LOW K   CBC WITH AUTO DIFFERENTIAL   TROPONIN   BLOOD GAS, ARTERIAL       EMERGENCY DEPARTMENTCOURSE:         Vitals:    Vitals:    08/27/20 1645 08/27/20 1700 08/27/20 1919 08/27/20 1959   BP: 107/82 (!) 128/90 127/89 (!) 127/93   Pulse: 96 111 94 108   Resp:   14 12   Temp:   98.3 °F (36.8 °C) 98.5 °F (36.9 °C)   TempSrc:   Oral Oral   SpO2: 91%  94% 94%   Weight:    178 lb 2.1 oz (80.8 kg)   Height:    5' 6\" (1.676 m)       The patient was given the following medications while in the emergency department:  Orders Placed This Encounter   Medications    0.9 % sodium chloride bolus    naloxone (NARCAN) injection 2 mg    naloxone (NARCAN) injection 1 mg    sodium chloride flush 0.9 % injection 10 mL    sodium chloride flush 0.9 % injection 10 mL    acetaminophen (TYLENOL) tablet 650 mg    enoxaparin (LOVENOX) injection 40 mg    sodium chloride flush 0.9 % injection 10 mL    sodium chloride flush 0.9 % injection 10 mL    OR Linked Order Group     LORazepam (ATIVAN) tablet 1 mg     LORazepam (ATIVAN) injection 1 mg     LORazepam (ATIVAN) tablet 2 mg     LORazepam (ATIVAN) injection 2 mg     LORazepam (ATIVAN) tablet 3 mg     LORazepam (ATIVAN) injection 3 mg     LORazepam (ATIVAN) tablet 4 mg     LORazepam (ATIVAN) injection 4 mg    0.9 % sodium chloride infusion     CONSULTS:  IP CONSULT TO SOCIAL WORK    FINAL IMPRESSION      1. Accidental drug overdose, initial encounter    2. Hypoxia          DISPOSITION/PLAN   DISPOSITION Admitted 08/27/2020 06:54:35 PM      PATIENT REFERRED TO:  No follow-up provider specified. DISCHARGE MEDICATIONS:  There are no discharge medications for this patient.     hSaran Sepulveda DO  Attending Emergency Physician                  Sharan Sepulveda DO  08/27/20 2100

## 2023-10-04 ENCOUNTER — APPOINTMENT (OUTPATIENT)
Dept: CT IMAGING | Age: 48
End: 2023-10-04
Payer: MEDICAID

## 2023-10-04 ENCOUNTER — APPOINTMENT (OUTPATIENT)
Dept: GENERAL RADIOLOGY | Age: 48
End: 2023-10-04
Payer: MEDICAID

## 2023-10-04 ENCOUNTER — HOSPITAL ENCOUNTER (EMERGENCY)
Age: 48
Discharge: LEFT BEFORE DISCHARGE | End: 2023-10-04
Attending: EMERGENCY MEDICINE
Payer: MEDICAID

## 2023-10-04 VITALS
RESPIRATION RATE: 15 BRPM | OXYGEN SATURATION: 96 % | DIASTOLIC BLOOD PRESSURE: 103 MMHG | WEIGHT: 185 LBS | HEIGHT: 67 IN | SYSTOLIC BLOOD PRESSURE: 157 MMHG | TEMPERATURE: 98.1 F | BODY MASS INDEX: 29.03 KG/M2 | HEART RATE: 94 BPM

## 2023-10-04 DIAGNOSIS — R42 DIZZINESS: Primary | ICD-10-CM

## 2023-10-04 DIAGNOSIS — R79.89 ELEVATED TROPONIN: ICD-10-CM
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
## 2023-10-04 DIAGNOSIS — R06.02 SHORTNESS OF BREATH: ICD-10-CM

## 2023-10-04 LAB
ANION GAP SERPL CALCULATED.3IONS-SCNC: 15 MMOL/L (ref 9–17)
ATYPICAL LYMPHOCYTE ABSOLUTE COUNT: 0.19 K/UL
ATYPICAL LYMPHOCYTES: 4 %
BASOPHILS # BLD: 0.05 K/UL (ref 0–0.2)
BASOPHILS NFR BLD: 1 % (ref 0–2)
BUN SERPL-MCNC: 9 MG/DL (ref 6–20)
CALCIUM SERPL-MCNC: 9.1 MG/DL (ref 8.6–10.4)
CHLORIDE SERPL-SCNC: 101 MMOL/L (ref 98–107)
CO2 SERPL-SCNC: 23 MMOL/L (ref 20–31)
CREAT SERPL-MCNC: 0.5 MG/DL (ref 0.7–1.2)
EOSINOPHIL # BLD: 0.14 K/UL (ref 0–0.4)
EOSINOPHILS RELATIVE PERCENT: 3 % (ref 0–4)
ERYTHROCYTE [DISTWIDTH] IN BLOOD BY AUTOMATED COUNT: 14.3 % (ref 11.5–14.9)
GFR SERPL CREATININE-BSD FRML MDRD: >60 ML/MIN/1.73M2
GLUCOSE SERPL-MCNC: 102 MG/DL (ref 70–99)
HCT VFR BLD AUTO: 45.9 % (ref 41–53)
HGB BLD-MCNC: 15.4 G/DL (ref 13.5–17.5)
LYMPHOCYTES NFR BLD: 2.6 K/UL (ref 1–4.8)
LYMPHOCYTES RELATIVE PERCENT: 54 % (ref 24–44)
MAGNESIUM SERPL-MCNC: 1.8 MG/DL (ref 1.6–2.6)
MCH RBC QN AUTO: 35.7 PG (ref 26–34)
MCHC RBC AUTO-ENTMCNC: 33.7 G/DL (ref 31–37)
MCV RBC AUTO: 106.2 FL (ref 80–100)
MONOCYTES NFR BLD: 0.19 K/UL (ref 0.1–1.3)
MONOCYTES NFR BLD: 4 % (ref 1–7)
MORPHOLOGY: ABNORMAL
NEUTROPHILS NFR BLD: 34 % (ref 36–66)
NEUTS SEG NFR BLD: 1.63 K/UL (ref 1.3–9.1)
PLATELET # BLD AUTO: 176 K/UL (ref 150–450)
PMV BLD AUTO: 7.5 FL (ref 6–12)
POTASSIUM SERPL-SCNC: 3.9 MMOL/L (ref 3.7–5.3)
RBC # BLD AUTO: 4.32 M/UL (ref 4.5–5.9)
SODIUM SERPL-SCNC: 139 MMOL/L (ref 135–144)
TROPONIN I SERPL HS-MCNC: 29 NG/L (ref 0–22)
TROPONIN I SERPL HS-MCNC: 30 NG/L (ref 0–22)
WBC OTHER # BLD: 4.8 K/UL (ref 3.5–11)

## 2023-10-04 PROCEDURE — 71046 X-RAY EXAM CHEST 2 VIEWS: CPT

## 2023-10-04 PROCEDURE — 36415 COLL VENOUS BLD VENIPUNCTURE: CPT

## 2023-10-04 PROCEDURE — 83735 ASSAY OF MAGNESIUM: CPT

## 2023-10-04 PROCEDURE — 6370000000 HC RX 637 (ALT 250 FOR IP): Performed by: EMERGENCY MEDICINE

## 2023-10-04 PROCEDURE — 93005 ELECTROCARDIOGRAM TRACING: CPT | Performed by: EMERGENCY MEDICINE

## 2023-10-04 PROCEDURE — 99285 EMERGENCY DEPT VISIT HI MDM: CPT

## 2023-10-04 PROCEDURE — 70450 CT HEAD/BRAIN W/O DYE: CPT

## 2023-10-04 PROCEDURE — 72125 CT NECK SPINE W/O DYE: CPT

## 2023-10-04 PROCEDURE — 80048 BASIC METABOLIC PNL TOTAL CA: CPT

## 2023-10-04 PROCEDURE — 85025 COMPLETE CBC W/AUTO DIFF WBC: CPT

## 2023-10-04 PROCEDURE — 84484 ASSAY OF TROPONIN QUANT: CPT

## 2023-10-04 PROCEDURE — 72170 X-RAY EXAM OF PELVIS: CPT

## 2023-10-04 PROCEDURE — 2580000003 HC RX 258: Performed by: EMERGENCY MEDICINE

## 2023-10-04 RX ORDER — 0.9 % SODIUM CHLORIDE 0.9 %
1000 INTRAVENOUS SOLUTION INTRAVENOUS ONCE
Status: COMPLETED | OUTPATIENT
Start: 2023-10-04 | End: 2023-10-04

## 2023-10-04 RX ORDER — HYDROXYZINE HYDROCHLORIDE 10 MG/1
10 TABLET, FILM COATED ORAL ONCE
Status: COMPLETED | OUTPATIENT
Start: 2023-10-04 | End: 2023-10-04

## 2023-10-04 RX ADMIN — SODIUM CHLORIDE 1000 ML: 9 INJECTION, SOLUTION INTRAVENOUS at 18:22

## 2023-10-04 RX ADMIN — HYDROXYZINE HYDROCHLORIDE 10 MG: 10 TABLET ORAL at 18:21

## 2023-10-04 ASSESSMENT — ENCOUNTER SYMPTOMS
ABDOMINAL PAIN: 0
VOMITING: 0
NAUSEA: 0
SHORTNESS OF BREATH: 1

## 2023-10-04 ASSESSMENT — LIFESTYLE VARIABLES
HOW OFTEN DO YOU HAVE A DRINK CONTAINING ALCOHOL: 4 OR MORE TIMES A WEEK
HOW MANY STANDARD DRINKS CONTAINING ALCOHOL DO YOU HAVE ON A TYPICAL DAY: 5 OR 6

## 2023-10-04 ASSESSMENT — PATIENT HEALTH QUESTIONNAIRE - PHQ9
SUM OF ALL RESPONSES TO PHQ QUESTIONS 1-9: 0
SUM OF ALL RESPONSES TO PHQ QUESTIONS 1-9: 0
SUM OF ALL RESPONSES TO PHQ9 QUESTIONS 1 & 2: 0
SUM OF ALL RESPONSES TO PHQ QUESTIONS 1-9: 0
SUM OF ALL RESPONSES TO PHQ QUESTIONS 1-9: 0
2. FEELING DOWN, DEPRESSED OR HOPELESS: 0
1. LITTLE INTEREST OR PLEASURE IN DOING THINGS: 0

## 2023-10-04 ASSESSMENT — HEART SCORE: ECG: 0

## 2023-10-04 ASSESSMENT — PAIN - FUNCTIONAL ASSESSMENT: PAIN_FUNCTIONAL_ASSESSMENT: NONE - DENIES PAIN

## 2023-10-04 NOTE — ED TRIAGE NOTES
Mode of arrival (squad #, walk in, police, etc) : walk in        Chief complaint(s): Dizziness, Fall, SOB, Anxiety        Arrival Note (brief scenario, treatment PTA, etc). : Pt reports he fell due to dizziness twice today. Pt states he believes he hit his head but did not lose conciousness. Pt reports feeling very anxiety and having trouble breathing. Pt is A&OX4.        C= \"Have you ever felt that you should Cut down on your drinking? \"  No  A= \"Have people Annoyed you by criticizing your drinking? \"  No  G= \"Have you ever felt bad or Guilty about your drinking? \"  No  E= \"Have you ever had a drink as an Eye-opener first thing in the morning to steady your nerves or to help a hangover? \"  No      Deferred []      Reason for deferring: N/A    *If yes to two or more: probable alcohol abuse. *

## 2023-10-05 NOTE — ED NOTES
Patient left AMA. Patient was advised of the risk of permanent disability or death. Patient is alert and oriented to person place time and event. Patient is answering question appropriately at this time.      Phu Greer RN  10/04/23 2043

## 2023-10-06 LAB
EKG ATRIAL RATE: 103 BPM
EKG P AXIS: 64 DEGREES
EKG P-R INTERVAL: 164 MS
EKG Q-T INTERVAL: 334 MS
EKG QRS DURATION: 90 MS
EKG QTC CALCULATION (BAZETT): 437 MS
EKG R AXIS: 36 DEGREES
EKG T AXIS: 75 DEGREES
EKG VENTRICULAR RATE: 103 BPM

## 2023-10-06 PROCEDURE — 93010 ELECTROCARDIOGRAM REPORT: CPT | Performed by: INTERNAL MEDICINE

## 2023-10-16 ENCOUNTER — HOSPITAL ENCOUNTER (INPATIENT)
Age: 48
LOS: 1 days | Discharge: HOME | DRG: 198 | End: 2023-10-17
Attending: STUDENT IN AN ORGANIZED HEALTH CARE EDUCATION/TRAINING PROGRAM | Admitting: INTERNAL MEDICINE
Payer: MEDICAID

## 2023-10-16 DIAGNOSIS — I20.9 ANGINA PECTORIS (HCC): ICD-10-CM

## 2023-10-16 DIAGNOSIS — R07.9 CHEST PAIN, UNSPECIFIED TYPE: Primary | ICD-10-CM

## 2023-10-16 PROCEDURE — 85379 FIBRIN DEGRADATION QUANT: CPT

## 2023-10-16 PROCEDURE — 84484 ASSAY OF TROPONIN QUANT: CPT

## 2023-10-16 PROCEDURE — 85610 PROTHROMBIN TIME: CPT

## 2023-10-16 PROCEDURE — 36415 COLL VENOUS BLD VENIPUNCTURE: CPT

## 2023-10-16 PROCEDURE — 85025 COMPLETE CBC W/AUTO DIFF WBC: CPT

## 2023-10-16 PROCEDURE — 80053 COMPREHEN METABOLIC PANEL: CPT

## 2023-10-16 PROCEDURE — 83735 ASSAY OF MAGNESIUM: CPT

## 2023-10-16 PROCEDURE — 99285 EMERGENCY DEPT VISIT HI MDM: CPT

## 2023-10-16 RX ORDER — IPRATROPIUM BROMIDE AND ALBUTEROL SULFATE 2.5; .5 MG/3ML; MG/3ML
1 SOLUTION RESPIRATORY (INHALATION) ONCE
Status: COMPLETED | OUTPATIENT
Start: 2023-10-17 | End: 2023-10-17

## 2023-10-16 RX ORDER — ASPIRIN 81 MG/1
324 TABLET, CHEWABLE ORAL ONCE
Status: COMPLETED | OUTPATIENT
Start: 2023-10-17 | End: 2023-10-17

## 2023-10-16 ASSESSMENT — ENCOUNTER SYMPTOMS
RHINORRHEA: 0
SHORTNESS OF BREATH: 0
SORE THROAT: 0
VOMITING: 0
ABDOMINAL PAIN: 0
NAUSEA: 0
DIARRHEA: 0
CHEST TIGHTNESS: 1
EYE REDNESS: 0
EYE DISCHARGE: 0

## 2023-10-16 ASSESSMENT — PAIN SCALES - GENERAL: PAINLEVEL_OUTOF10: 5

## 2023-10-16 ASSESSMENT — PAIN DESCRIPTION - DESCRIPTORS: DESCRIPTORS: PRESSURE

## 2023-10-16 ASSESSMENT — PAIN - FUNCTIONAL ASSESSMENT: PAIN_FUNCTIONAL_ASSESSMENT: 0-10

## 2023-10-16 ASSESSMENT — PAIN DESCRIPTION - PAIN TYPE: TYPE: ACUTE PAIN

## 2023-10-16 ASSESSMENT — PAIN DESCRIPTION - LOCATION: LOCATION: CHEST

## 2023-10-17 ENCOUNTER — APPOINTMENT (OUTPATIENT)
Dept: GENERAL RADIOLOGY | Age: 48
DRG: 198 | End: 2023-10-17
Payer: MEDICAID

## 2023-10-17 ENCOUNTER — HOSPITAL ENCOUNTER (INPATIENT)
Age: 48
Discharge: HOME | DRG: 198 | End: 2023-10-19
Payer: MEDICAID

## 2023-10-17 VITALS
WEIGHT: 175.04 LBS | RESPIRATION RATE: 20 BRPM | BODY MASS INDEX: 27.47 KG/M2 | OXYGEN SATURATION: 95 % | HEART RATE: 103 BPM | SYSTOLIC BLOOD PRESSURE: 142 MMHG | TEMPERATURE: 98 F | HEIGHT: 67 IN | DIASTOLIC BLOOD PRESSURE: 90 MMHG

## 2023-10-17 DIAGNOSIS — I20.9 ANGINA PECTORIS (HCC): Primary | ICD-10-CM

## 2023-10-17 LAB
ALBUMIN SERPL-MCNC: 4.3 G/DL (ref 3.5–5.2)
ALP SERPL-CCNC: 61 U/L (ref 40–129)
ALT SERPL-CCNC: 58 U/L (ref 5–41)
ANION GAP SERPL CALCULATED.3IONS-SCNC: 12 MMOL/L (ref 9–17)
ANION GAP SERPL CALCULATED.3IONS-SCNC: 16 MMOL/L (ref 9–17)
AST SERPL-CCNC: 103 U/L
BASOPHILS # BLD: 0 K/UL (ref 0–0.2)
BASOPHILS NFR BLD: 0 % (ref 0–2)
BILIRUB SERPL-MCNC: 0.9 MG/DL (ref 0.3–1.2)
BNP SERPL-MCNC: <36 PG/ML
BUN SERPL-MCNC: 6 MG/DL (ref 6–20)
BUN SERPL-MCNC: 6 MG/DL (ref 6–20)
CALCIUM SERPL-MCNC: 9 MG/DL (ref 8.6–10.4)
CALCIUM SERPL-MCNC: 9.4 MG/DL (ref 8.6–10.4)
CHLORIDE SERPL-SCNC: 101 MMOL/L (ref 98–107)
CHLORIDE SERPL-SCNC: 98 MMOL/L (ref 98–107)
CO2 SERPL-SCNC: 26 MMOL/L (ref 20–31)
CO2 SERPL-SCNC: 27 MMOL/L (ref 20–31)
CREAT SERPL-MCNC: 0.4 MG/DL (ref 0.7–1.2)
CREAT SERPL-MCNC: 0.5 MG/DL (ref 0.7–1.2)
D DIMER PPP FEU-MCNC: 0.35 UG/ML FEU (ref 0–0.59)
ECHO BSA: 1.94 M2
EOSINOPHIL # BLD: 0 K/UL (ref 0–0.4)
EOSINOPHILS RELATIVE PERCENT: 0 % (ref 0–4)
ERYTHROCYTE [DISTWIDTH] IN BLOOD BY AUTOMATED COUNT: 14.7 % (ref 11.5–14.9)
GFR SERPL CREATININE-BSD FRML MDRD: >60 ML/MIN/1.73M2
GFR SERPL CREATININE-BSD FRML MDRD: >60 ML/MIN/1.73M2
GLUCOSE SERPL-MCNC: 93 MG/DL (ref 70–99)
GLUCOSE SERPL-MCNC: 97 MG/DL (ref 70–99)
HCT VFR BLD AUTO: 47.3 % (ref 41–53)
HGB BLD-MCNC: 16 G/DL (ref 13.5–17.5)
INR PPP: 1
LYMPHOCYTES NFR BLD: 1.99 K/UL (ref 1–4.8)
LYMPHOCYTES RELATIVE PERCENT: 51 % (ref 24–44)
MAGNESIUM SERPL-MCNC: 1.9 MG/DL (ref 1.6–2.6)
MCH RBC QN AUTO: 36 PG (ref 26–34)
MCHC RBC AUTO-ENTMCNC: 33.9 G/DL (ref 31–37)
MCV RBC AUTO: 106.5 FL (ref 80–100)
MONOCYTES NFR BLD: 0.39 K/UL (ref 0.1–1.3)
MONOCYTES NFR BLD: 10 % (ref 1–7)
MORPHOLOGY: ABNORMAL
MORPHOLOGY: ABNORMAL
NEUTROPHILS NFR BLD: 39 % (ref 36–66)
NEUTS SEG NFR BLD: 1.52 K/UL (ref 1.3–9.1)
PLATELET # BLD AUTO: 193 K/UL (ref 150–450)
PMV BLD AUTO: 8 FL (ref 6–12)
POTASSIUM SERPL-SCNC: 3.3 MMOL/L (ref 3.7–5.3)
POTASSIUM SERPL-SCNC: 3.6 MMOL/L (ref 3.7–5.3)
PROT SERPL-MCNC: 7.7 G/DL (ref 6.4–8.3)
PROTHROMBIN TIME: 13.3 SEC (ref 11.8–14.6)
RBC # BLD AUTO: 4.44 M/UL (ref 4.5–5.9)
SODIUM SERPL-SCNC: 140 MMOL/L (ref 135–144)
SODIUM SERPL-SCNC: 140 MMOL/L (ref 135–144)
STRESS BASELINE DIAS BP: 103 MMHG
STRESS BASELINE HR: 81 BPM
STRESS BASELINE SYS BP: 161 MMHG
STRESS ESTIMATED WORKLOAD: 1 METS
STRESS PEAK DIAS BP: 94 MMHG
STRESS PEAK SYS BP: 204 MMHG
STRESS PERCENT HR ACHIEVED: 99 %
STRESS POST PEAK HR: 169 BPM
STRESS RATE PRESSURE PRODUCT: NORMAL BPM*MMHG
STRESS ST DEPRESSION: 0 MM
STRESS STAGE RECOVERY 1 BP: NORMAL MMHG
STRESS STAGE RECOVERY 1 DURATION: 1 MIN:SEC
STRESS STAGE RECOVERY 1 HR: 150 BPM
STRESS STAGE RECOVERY 2 BP: NORMAL MMHG
STRESS STAGE RECOVERY 2 DURATION: 5 MIN:SEC
STRESS STAGE RECOVERY 2 HR: 93 BPM
STRESS TARGET HR: 171 BPM
TROPONIN I SERPL HS-MCNC: 22 NG/L (ref 0–22)
TROPONIN I SERPL HS-MCNC: 23 NG/L (ref 0–22)
TSH SERPL DL<=0.05 MIU/L-ACNC: 1.92 UIU/ML (ref 0.3–5)
WBC OTHER # BLD: 3.9 K/UL (ref 3.5–11)

## 2023-10-17 PROCEDURE — 36415 COLL VENOUS BLD VENIPUNCTURE: CPT

## 2023-10-17 PROCEDURE — 80048 BASIC METABOLIC PNL TOTAL CA: CPT

## 2023-10-17 PROCEDURE — 6370000000 HC RX 637 (ALT 250 FOR IP): Performed by: NURSE PRACTITIONER

## 2023-10-17 PROCEDURE — 84443 ASSAY THYROID STIM HORMONE: CPT

## 2023-10-17 PROCEDURE — 83880 ASSAY OF NATRIURETIC PEPTIDE: CPT

## 2023-10-17 PROCEDURE — 6370000000 HC RX 637 (ALT 250 FOR IP): Performed by: STUDENT IN AN ORGANIZED HEALTH CARE EDUCATION/TRAINING PROGRAM

## 2023-10-17 PROCEDURE — 93017 CV STRESS TEST TRACING ONLY: CPT

## 2023-10-17 PROCEDURE — 94640 AIRWAY INHALATION TREATMENT: CPT

## 2023-10-17 PROCEDURE — 99223 1ST HOSP IP/OBS HIGH 75: CPT | Performed by: INTERNAL MEDICINE

## 2023-10-17 PROCEDURE — 2060000000 HC ICU INTERMEDIATE R&B

## 2023-10-17 PROCEDURE — 2580000003 HC RX 258: Performed by: NURSE PRACTITIONER

## 2023-10-17 PROCEDURE — 71045 X-RAY EXAM CHEST 1 VIEW: CPT

## 2023-10-17 RX ORDER — METOPROLOL TARTRATE 5 MG/5ML
5 INJECTION INTRAVENOUS EVERY 5 MIN PRN
Status: ACTIVE | OUTPATIENT
Start: 2023-10-17 | End: 2023-10-17

## 2023-10-17 RX ORDER — LORAZEPAM 1 MG/1
3 TABLET ORAL
Status: DISCONTINUED | OUTPATIENT
Start: 2023-10-17 | End: 2023-10-17 | Stop reason: HOSPADM

## 2023-10-17 RX ORDER — M-VIT,TX,IRON,MINS/CALC/FOLIC 27MG-0.4MG
1 TABLET ORAL DAILY
Status: DISCONTINUED | OUTPATIENT
Start: 2023-10-17 | End: 2023-10-17 | Stop reason: HOSPADM

## 2023-10-17 RX ORDER — ASPIRIN 81 MG/1
81 TABLET, CHEWABLE ORAL DAILY
Status: DISCONTINUED | OUTPATIENT
Start: 2023-10-18 | End: 2023-10-17 | Stop reason: HOSPADM

## 2023-10-17 RX ORDER — SODIUM CHLORIDE 9 MG/ML
INJECTION, SOLUTION INTRAVENOUS PRN
Status: DISCONTINUED | OUTPATIENT
Start: 2023-10-17 | End: 2023-10-17 | Stop reason: HOSPADM

## 2023-10-17 RX ORDER — LORAZEPAM 1 MG/1
2 TABLET ORAL
Status: DISCONTINUED | OUTPATIENT
Start: 2023-10-17 | End: 2023-10-17 | Stop reason: HOSPADM

## 2023-10-17 RX ORDER — ONDANSETRON 4 MG/1
4 TABLET, ORALLY DISINTEGRATING ORAL EVERY 8 HOURS PRN
Status: DISCONTINUED | OUTPATIENT
Start: 2023-10-17 | End: 2023-10-17 | Stop reason: HOSPADM

## 2023-10-17 RX ORDER — NICOTINE 21 MG/24HR
1 PATCH, TRANSDERMAL 24 HOURS TRANSDERMAL DAILY
Status: DISCONTINUED | OUTPATIENT
Start: 2023-10-17 | End: 2023-10-17 | Stop reason: HOSPADM

## 2023-10-17 RX ORDER — LORAZEPAM 2 MG/ML
2 INJECTION INTRAMUSCULAR
Status: DISCONTINUED | OUTPATIENT
Start: 2023-10-17 | End: 2023-10-17 | Stop reason: HOSPADM

## 2023-10-17 RX ORDER — GAUZE BANDAGE 2" X 2"
100 BANDAGE TOPICAL DAILY
Status: DISCONTINUED | OUTPATIENT
Start: 2023-10-17 | End: 2023-10-17 | Stop reason: HOSPADM

## 2023-10-17 RX ORDER — LORAZEPAM 1 MG/1
4 TABLET ORAL
Status: DISCONTINUED | OUTPATIENT
Start: 2023-10-17 | End: 2023-10-17 | Stop reason: HOSPADM

## 2023-10-17 RX ORDER — LORAZEPAM 2 MG/ML
3 INJECTION INTRAMUSCULAR
Status: DISCONTINUED | OUTPATIENT
Start: 2023-10-17 | End: 2023-10-17 | Stop reason: HOSPADM

## 2023-10-17 RX ORDER — SODIUM CHLORIDE 0.9 % (FLUSH) 0.9 %
5-40 SYRINGE (ML) INJECTION EVERY 12 HOURS SCHEDULED
Status: DISCONTINUED | OUTPATIENT
Start: 2023-10-17 | End: 2023-10-17 | Stop reason: HOSPADM

## 2023-10-17 RX ORDER — SODIUM CHLORIDE 0.9 % (FLUSH) 0.9 %
5-40 SYRINGE (ML) INJECTION PRN
Status: ACTIVE | OUTPATIENT
Start: 2023-10-17 | End: 2023-10-17

## 2023-10-17 RX ORDER — POLYETHYLENE GLYCOL 3350 17 G/17G
17 POWDER, FOR SOLUTION ORAL DAILY PRN
Status: DISCONTINUED | OUTPATIENT
Start: 2023-10-17 | End: 2023-10-17 | Stop reason: HOSPADM

## 2023-10-17 RX ORDER — NITROGLYCERIN 0.4 MG/1
0.4 TABLET SUBLINGUAL EVERY 5 MIN PRN
Status: ACTIVE | OUTPATIENT
Start: 2023-10-17 | End: 2023-10-17

## 2023-10-17 RX ORDER — LORAZEPAM 2 MG/ML
4 INJECTION INTRAMUSCULAR
Status: DISCONTINUED | OUTPATIENT
Start: 2023-10-17 | End: 2023-10-17 | Stop reason: HOSPADM

## 2023-10-17 RX ORDER — SODIUM CHLORIDE 0.9 % (FLUSH) 0.9 %
5-40 SYRINGE (ML) INJECTION PRN
Status: DISCONTINUED | OUTPATIENT
Start: 2023-10-17 | End: 2023-10-17 | Stop reason: HOSPADM

## 2023-10-17 RX ORDER — LORAZEPAM 2 MG/ML
1 INJECTION INTRAMUSCULAR
Status: DISCONTINUED | OUTPATIENT
Start: 2023-10-17 | End: 2023-10-17 | Stop reason: HOSPADM

## 2023-10-17 RX ORDER — SODIUM CHLORIDE 9 MG/ML
500 INJECTION, SOLUTION INTRAVENOUS CONTINUOUS PRN
Status: ACTIVE | OUTPATIENT
Start: 2023-10-17 | End: 2023-10-17

## 2023-10-17 RX ORDER — ALBUTEROL SULFATE 90 UG/1
2 AEROSOL, METERED RESPIRATORY (INHALATION) PRN
Status: DISCONTINUED | OUTPATIENT
Start: 2023-10-17 | End: 2023-10-17 | Stop reason: HOSPADM

## 2023-10-17 RX ORDER — ENOXAPARIN SODIUM 100 MG/ML
40 INJECTION SUBCUTANEOUS DAILY
Status: DISCONTINUED | OUTPATIENT
Start: 2023-10-17 | End: 2023-10-17 | Stop reason: HOSPADM

## 2023-10-17 RX ORDER — ACETAMINOPHEN 325 MG/1
650 TABLET ORAL EVERY 6 HOURS PRN
Status: DISCONTINUED | OUTPATIENT
Start: 2023-10-17 | End: 2023-10-17 | Stop reason: HOSPADM

## 2023-10-17 RX ORDER — ATROPINE SULFATE 0.1 MG/ML
0.5 INJECTION INTRAVENOUS EVERY 5 MIN PRN
Status: ACTIVE | OUTPATIENT
Start: 2023-10-17 | End: 2023-10-17

## 2023-10-17 RX ORDER — LORAZEPAM 1 MG/1
1 TABLET ORAL
Status: DISCONTINUED | OUTPATIENT
Start: 2023-10-17 | End: 2023-10-17 | Stop reason: HOSPADM

## 2023-10-17 RX ORDER — ACETAMINOPHEN 650 MG/1
650 SUPPOSITORY RECTAL EVERY 6 HOURS PRN
Status: DISCONTINUED | OUTPATIENT
Start: 2023-10-17 | End: 2023-10-17 | Stop reason: HOSPADM

## 2023-10-17 RX ORDER — ONDANSETRON 2 MG/ML
4 INJECTION INTRAMUSCULAR; INTRAVENOUS EVERY 6 HOURS PRN
Status: DISCONTINUED | OUTPATIENT
Start: 2023-10-17 | End: 2023-10-17 | Stop reason: HOSPADM

## 2023-10-17 RX ORDER — ATORVASTATIN CALCIUM 40 MG/1
40 TABLET, FILM COATED ORAL NIGHTLY
Status: DISCONTINUED | OUTPATIENT
Start: 2023-10-17 | End: 2023-10-17 | Stop reason: HOSPADM

## 2023-10-17 RX ADMIN — THIAMINE HCL TAB 100 MG 100 MG: 100 TAB at 07:25

## 2023-10-17 RX ADMIN — ASPIRIN 324 MG: 81 TABLET, CHEWABLE ORAL at 00:56

## 2023-10-17 RX ADMIN — MULTIPLE VITAMINS W/ MINERALS TAB 1 TABLET: TAB at 07:25

## 2023-10-17 RX ADMIN — SODIUM CHLORIDE, PRESERVATIVE FREE 10 ML: 5 INJECTION INTRAVENOUS at 07:26

## 2023-10-17 RX ADMIN — IPRATROPIUM BROMIDE AND ALBUTEROL SULFATE 1 DOSE: .5; 2.5 SOLUTION RESPIRATORY (INHALATION) at 00:13

## 2023-10-17 ASSESSMENT — PAIN DESCRIPTION - LOCATION: LOCATION: CHEST

## 2023-10-17 ASSESSMENT — PAIN DESCRIPTION - DESCRIPTORS: DESCRIPTORS: PRESSURE

## 2023-10-17 ASSESSMENT — PAIN DESCRIPTION - PAIN TYPE: TYPE: ACUTE PAIN

## 2023-10-17 ASSESSMENT — HEART SCORE: ECG: 0

## 2023-10-17 ASSESSMENT — PAIN SCALES - GENERAL: PAINLEVEL_OUTOF10: 3

## 2023-10-17 NOTE — H&P
Collection Time: 10/16/23 11:00 PM   Result Value Ref Range    Sodium 140 135 - 144 mmol/L    Potassium 3.3 (L) 3.7 - 5.3 mmol/L    Chloride 98 98 - 107 mmol/L    CO2 26 20 - 31 mmol/L    Anion Gap 16 9 - 17 mmol/L    Glucose 97 70 - 99 mg/dL    BUN 6 6 - 20 mg/dL    Creatinine 0.4 (L) 0.7 - 1.2 mg/dL    Est, Glom Filt Rate >60 >60 mL/min/1.73m2    Calcium 9.4 8.6 - 10.4 mg/dL    Total Protein 7.7 6.4 - 8.3 g/dL    Albumin 4.3 3.5 - 5.2 g/dL    Total Bilirubin 0.9 0.3 - 1.2 mg/dL    Alkaline Phosphatase 61 40 - 129 U/L    ALT 58 (H) 5 - 41 U/L     (H) <40 U/L   Magnesium    Collection Time: 10/16/23 11:00 PM   Result Value Ref Range    Magnesium 1.9 1.6 - 2.6 mg/dL   Protime-INR    Collection Time: 10/16/23 11:00 PM   Result Value Ref Range    Protime 13.3 11.8 - 14.6 sec    INR 1.0    Troponin Now and Q 1 Hour    Collection Time: 10/16/23 11:00 PM   Result Value Ref Range    Troponin, High Sensitivity 22 0 - 22 ng/L   D-Dimer, Quantitative    Collection Time: 10/16/23 11:00 PM   Result Value Ref Range    D-Dimer, Quant 0.35 0.00 - 0.59 ug/mL FEU   Troponin Now and Q 1 Hour    Collection Time: 10/17/23  2:11 AM   Result Value Ref Range    Troponin, High Sensitivity 23 (H) 0 - 22 ng/L   Basic Metabolic Panel w/ Reflex to MG    Collection Time: 10/17/23  4:49 AM   Result Value Ref Range    Sodium 140 135 - 144 mmol/L    Potassium 3.6 (L) 3.7 - 5.3 mmol/L    Chloride 101 98 - 107 mmol/L    CO2 27 20 - 31 mmol/L    Anion Gap 12 9 - 17 mmol/L    Glucose 93 70 - 99 mg/dL    BUN 6 6 - 20 mg/dL    Creatinine 0.5 (L) 0.7 - 1.2 mg/dL    Est, Glom Filt Rate >60 >60 mL/min/1.73m2    Calcium 9.0 8.6 - 10.4 mg/dL   Brain natriuretic peptide    Collection Time: 10/17/23  4:49 AM   Result Value Ref Range    Pro-BNP <36 <300 pg/mL   TSH with Reflex    Collection Time: 10/17/23  4:49 AM   Result Value Ref Range    TSH 1.92 0.30 - 5.00 uIU/mL   Exercise stress test    Collection Time: 10/17/23  8:21 AM   Result Value Ref

## 2023-10-17 NOTE — FLOWSHEET NOTE
Dr Campbell [place] secure messaged: pt has been npo for exercise stress. 2nd part has not been done. Can pt eat or are we thinking a possible cath today?

## 2023-10-17 NOTE — PROGRESS NOTES
[facility] Internal Medicine  Justus Giron MD; Fawn Kevin MD; Sahara Etienne MD; MD Julia Aden MD; MD [facility]   [address]                 Date:   10/17/2023  Patientname:  Jordan Chaney  Date of admission:  10/16/2023 10:11 PM  MRN:   [id number]  Account:  [de-identified]  YOB: 1975  PCP:    No primary care provider on file. Room:   [address] [place] [address]  Code Status:    Full Code      Chief Complaint:     Chief Complaint   Patient presents with    Chest Pain    Alcohol Intoxication       History of Present Illness:     Jordan Chaney is a 51 y.o. Non- / non  male who presents with Chest Pain and Alcohol Intoxication and is admitted to the hospital for the management of Angina pectoris ([address] [facility]). Medical history significant for NSTEMI, acute coronary syndrome HTN, HLD, COPD, emphysema, TERELL, nicotine dependence and alcohol abuse. Presented to the ED intoxicated and complaining of chest tightness and pain. Reports chest tightness has been ongoing for 4 days. Was seen in the ED on 10/4 for similar complaints but left AMA due to needing to care for his mother at home. Previous cardiac cath in 2020 with LAD 40% stenosis, subtotal 80% stenosis. Troponin 22 with repeat 23. EKG NSR.  CXR linear left basilar infiltrate likely related to atelectasis. Admits to daily alcohol use with 1/5 vodka and 3-5 beers daily. Already appears to have tremors with last alcohol intake less than 12 hours ago. CIWA protocol initiated. Denies fever, chills, abdominal pain, nausea, vomiting, diarrhea, and urinary symptoms. Plan to admit to progressive unit with orders for echo, stress test and cardiology evaluation.   CIWA protocol with Ativan as needed for impending alcohol withdrawal.        TEDDY Hoff - NP  10/17/2023  7:44 AM      Please note that this chart was generated using voice

## 2023-10-17 NOTE — PLAN OF CARE
Problem: Pain  Goal: Verbalizes/displays adequate comfort level or baseline comfort level  10/17/2023 0404 by Levan Scheuermann, RN  Outcome: Progressing     Problem: Safety - Adult  Goal: Free from fall injury  10/17/2023 0404 by Levan Scheuermann, RN  Outcome: Progressing

## 2023-10-17 NOTE — ED PROVIDER NOTES
EMERGENCY DEPARTMENT ENCOUNTER    Pt Name: Pedro Stallworth  MRN: [id number]  [address] [facility] 1975  Date of evaluation: 10/16/23  CHIEF COMPLAINT       Chief Complaint   Patient presents with    Chest Pain    Alcohol Intoxication     HISTORY OF PRESENT ILLNESS   69-year-old male with a history of hypertension, hyperlipidemia, COPD smoker presenting with chest tightness    States for the last 4 days having chest tightness. Denies any pleuritic pain. Nonexertional.  No leg swelling recent travel or surgery    No fevers chills productive cough                  REVIEW OF SYSTEMS     Review of Systems   Constitutional:  Negative for chills and fever. HENT:  Negative for rhinorrhea and sore throat. Eyes:  Negative for discharge and redness. Respiratory:  Positive for chest tightness. Negative for shortness of breath. Cardiovascular:  Positive for chest pain. Gastrointestinal:  Negative for abdominal pain, diarrhea, nausea and vomiting. Genitourinary:  Negative for dysuria and frequency. Musculoskeletal:  Negative for arthralgias and myalgias. Skin:  Negative for rash. Neurological:  Negative for weakness and numbness. Psychiatric/Behavioral:  Negative for suicidal ideas. All other systems reviewed and are negative. PASTMEDICAL HISTORY     Past Medical History:   Diagnosis Date    Hypertension     Overdose      Past Problem List  Patient Active Problem List   Diagnosis Code    Hypoxia R09.02    Overdose T50.901A    Acute respiratory failure with hypoxia and hypercapnia ([facility]) J96.01, J96.02    Respiratory acidosis E87.29    NSTEMI (non-ST elevated myocardial infarction) ([address]) I21.4    Other emphysema ([facility]) J43.8    Hypertension I10    Substance abuse ([address]) F19.10    ACS (acute coronary syndrome) ([facility]) I24.9    JAY (acute kidney injury) ([address]) N17.9    TERELL and COPD overlap syndrome ([facility]) G47.33, J44.9    Angina pectoris ([address]) I20.9     SURGICAL HISTORY     History reviewed.  No pertinent surgical

## 2023-10-17 NOTE — ED NOTES
Pt resting comfortably with eyes closed. Pt reports 0/10 chest pressure at this time. Pt was readjusted in bed and placed back on monitor. Troponin drawn, labeled at bedside and sent to lab. No further needs expressed at this time.       Jean Toro RN  10/17/23 5307

## 2023-10-17 NOTE — PROGRESS NOTES
Pt wants to go AMA. \"I have to get home to take care of my mom. She's bedridden. My sister is battling cancer. \" Tessa Eastman sister is calling for update and told writer \"I am fine. He's just wanting a drink. If you can keep him there, that be his best place. \" Teachings given that I can't hold him against his will since he's a/ox4. Offered pt to stay until cardiology and primary, offered food, offered to score ciwa and get ativan to help with the withdrawals. Denies. Pt still insistent on leaving ama.

## 2023-10-19 LAB
STRESS ESTIMATED WORKLOAD: 4.6 METS
STRESS PEAK DIAS BP: 94 MMHG
STRESS PEAK SYS BP: 204 MMHG
STRESS POST PEAK HR: 171 BPM
STRESS TARGET HR: 171 BPM

## 2025-02-26 ENCOUNTER — OFFICE VISIT (OUTPATIENT)
Dept: FAMILY MEDICINE CLINIC | Age: 50
End: 2025-02-26
Payer: MEDICAID

## 2025-02-26 VITALS
HEART RATE: 94 BPM | OXYGEN SATURATION: 97 % | SYSTOLIC BLOOD PRESSURE: 162 MMHG | DIASTOLIC BLOOD PRESSURE: 92 MMHG | TEMPERATURE: 97.6 F

## 2025-02-26 DIAGNOSIS — B02.9 HERPES ZOSTER WITHOUT COMPLICATION: Primary | ICD-10-CM
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
## 2025-02-26 PROCEDURE — 3077F SYST BP >= 140 MM HG: CPT | Performed by: PHYSICIAN ASSISTANT

## 2025-02-26 PROCEDURE — 3080F DIAST BP >= 90 MM HG: CPT | Performed by: PHYSICIAN ASSISTANT

## 2025-02-26 PROCEDURE — 99213 OFFICE O/P EST LOW 20 MIN: CPT | Performed by: PHYSICIAN ASSISTANT

## 2025-02-26 RX ORDER — VALACYCLOVIR HYDROCHLORIDE 1 G/1
1000 TABLET, FILM COATED ORAL 3 TIMES DAILY
Qty: 21 TABLET | Refills: 0 | Status: SHIPPED | OUTPATIENT
Start: 2025-02-26 | End: 2025-03-05

## 2025-02-26 RX ORDER — PREDNISONE 20 MG/1
20 TABLET ORAL 2 TIMES DAILY
Qty: 10 TABLET | Refills: 0 | Status: SHIPPED | OUTPATIENT
Start: 2025-02-26 | End: 2025-03-03

## 2025-02-26 ASSESSMENT — ENCOUNTER SYMPTOMS
RESPIRATORY NEGATIVE: 1
GASTROINTESTINAL NEGATIVE: 1

## 2025-02-26 NOTE — PROGRESS NOTES
Diagnosis Date    Hypertension     Overdose          SURGICAL HISTORY     History reviewed.  No past surgical history on file.      CURRENT MEDICATIONS       Current Outpatient Medications   Medication Sig Dispense Refill    valACYclovir (VALTREX) 1 g tablet Take 1 tablet by mouth 3 times daily for 7 days 21 tablet 0    predniSONE (DELTASONE) 20 MG tablet Take 1 tablet by mouth 2 times daily for 5 days 10 tablet 0    aspirin 81 MG chewable tablet Take 1 tablet by mouth daily (Patient not taking: Reported on 10/4/2023) 30 tablet 3    carvedilol (COREG) 6.25 MG tablet Take 1 tablet by mouth 2 times daily (with meals) (Patient not taking: Reported on 10/4/2023) 60 tablet 3    amLODIPine (NORVASC) 10 MG tablet Take 1 tablet by mouth daily (Patient not taking: Reported on 10/4/2023) 30 tablet 3    atorvastatin (LIPITOR) 40 MG tablet Take 1 tablet by mouth daily (Patient not taking: Reported on 10/4/2023) 30 tablet 3     No current facility-administered medications for this visit.         ALLERGIES     Patient has no known allergies.    FAMILY HISTORY     No family history on file.  No family status information on file.          SOCIAL HISTORY      reports that he has been smoking cigarettes. He has never used smokeless tobacco. He reports current alcohol use. He reports that he does not currently use drugs after having used the following drugs: Other-see comments.      PHYSICAL EXAM    (up to 7 for level 4, 8 or more for level 5)     Vitals:    02/26/25 1541 02/26/25 1543   BP: (!) 176/94 (!) 162/92   Site: Left Upper Arm    Position: Sitting    Cuff Size: Medium Adult    Pulse: 94    Temp: 97.6 °F (36.4 °C)    TempSrc: Oral    SpO2: 97%          Physical Exam  Vitals and nursing note reviewed.   Constitutional:       General: He is not in acute distress.     Appearance: Normal appearance. He is not ill-appearing.   HENT:      Head: Normocephalic and atraumatic.      Right Ear: External ear normal.      Left Ear:

## 2025-05-26 ENCOUNTER — HOSPITAL ENCOUNTER (EMERGENCY)
Age: 50
Discharge: HOME | End: 2025-05-27
Attending: STUDENT IN AN ORGANIZED HEALTH CARE EDUCATION/TRAINING PROGRAM
Payer: MEDICAID

## 2025-05-26 DIAGNOSIS — F10.10 ALCOHOL ABUSE: Primary | ICD-10-CM

## 2025-05-26 LAB
ALBUMIN SERPL-MCNC: 3.9 G/DL (ref 3.5–5.2)
ALP SERPL-CCNC: 54 U/L (ref 40–129)
ALT SERPL-CCNC: 98 U/L (ref 10–50)
ANION GAP SERPL CALCULATED.3IONS-SCNC: 16 MMOL/L (ref 9–16)
AST SERPL-CCNC: 149 U/L (ref 10–50)
BILIRUB SERPL-MCNC: 0.6 MG/DL (ref 0–1.2)
BUN SERPL-MCNC: 10 MG/DL (ref 6–20)
CALCIUM SERPL-MCNC: 8.9 MG/DL (ref 8.6–10.4)
CHLORIDE SERPL-SCNC: 101 MMOL/L (ref 98–107)
CO2 SERPL-SCNC: 23 MMOL/L (ref 20–31)
CREAT SERPL-MCNC: 0.5 MG/DL (ref 0.7–1.2)
ERYTHROCYTE [DISTWIDTH] IN BLOOD BY AUTOMATED COUNT: 13.8 % (ref 11.5–14.9)
ETHANOL PERCENT: 0.17 %
ETHANOL PERCENT: 0.38 %
ETHANOLAMINE SERPL-MCNC: 171 MG/DL (ref 0–0.08)
ETHANOLAMINE SERPL-MCNC: 377 MG/DL (ref 0–0.08)
GFR, ESTIMATED: >90 ML/MIN/1.73M2
GLUCOSE SERPL-MCNC: 97 MG/DL (ref 74–99)
HCT VFR BLD AUTO: 43.2 % (ref 41–53)
HGB BLD-MCNC: 15 G/DL (ref 13.5–17.5)
MCH RBC QN AUTO: 35.5 PG (ref 26–34)
MCHC RBC AUTO-ENTMCNC: 34.7 G/DL (ref 31–37)
MCV RBC AUTO: 102.4 FL (ref 80–100)
NRBC BLD-RTO: 0 PER 100 WBC
PLATELET # BLD AUTO: 163 K/UL (ref 150–450)
PMV BLD AUTO: 9.8 FL (ref 8–13.5)
POTASSIUM SERPL-SCNC: 3.9 MMOL/L (ref 3.7–5.3)
PROT SERPL-MCNC: 7.1 G/DL (ref 6.6–8.7)
RBC # BLD AUTO: 4.22 M/UL (ref 4.21–5.77)
SODIUM SERPL-SCNC: 140 MMOL/L (ref 136–145)
WBC OTHER # BLD: 5.2 K/UL (ref 3.5–11)

## 2025-05-26 PROCEDURE — 99284 EMERGENCY DEPT VISIT MOD MDM: CPT

## 2025-05-26 PROCEDURE — 6370000000 HC RX 637 (ALT 250 FOR IP): Performed by: STUDENT IN AN ORGANIZED HEALTH CARE EDUCATION/TRAINING PROGRAM

## 2025-05-26 PROCEDURE — 85027 COMPLETE CBC AUTOMATED: CPT

## 2025-05-26 PROCEDURE — 80053 COMPREHEN METABOLIC PANEL: CPT

## 2025-05-26 PROCEDURE — G0480 DRUG TEST DEF 1-7 CLASSES: HCPCS

## 2025-05-26 PROCEDURE — 96365 THER/PROPH/DIAG IV INF INIT: CPT

## 2025-05-26 PROCEDURE — 36415 COLL VENOUS BLD VENIPUNCTURE: CPT

## 2025-05-26 PROCEDURE — 6360000002 HC RX W HCPCS: Performed by: STUDENT IN AN ORGANIZED HEALTH CARE EDUCATION/TRAINING PROGRAM

## 2025-05-26 PROCEDURE — 2580000003 HC RX 258: Performed by: STUDENT IN AN ORGANIZED HEALTH CARE EDUCATION/TRAINING PROGRAM

## 2025-05-26 RX ORDER — LORAZEPAM 2 MG/ML
2 INJECTION INTRAMUSCULAR
Status: DISCONTINUED | OUTPATIENT
Start: 2025-05-26 | End: 2025-05-26

## 2025-05-26 RX ORDER — DIAZEPAM 5 MG/1
5 TABLET ORAL ONCE
Status: COMPLETED | OUTPATIENT
Start: 2025-05-26 | End: 2025-05-26

## 2025-05-26 RX ORDER — LORAZEPAM 1 MG/1
1 TABLET ORAL
Status: DISCONTINUED | OUTPATIENT
Start: 2025-05-26 | End: 2025-05-26

## 2025-05-26 RX ORDER — SODIUM CHLORIDE 0.9 % (FLUSH) 0.9 %
5-40 SYRINGE (ML) INJECTION EVERY 12 HOURS SCHEDULED
Status: DISCONTINUED | OUTPATIENT
Start: 2025-05-26 | End: 2025-05-27 | Stop reason: HOSPADM

## 2025-05-26 RX ORDER — LORAZEPAM 2 MG/ML
2 INJECTION INTRAMUSCULAR ONCE
Status: DISCONTINUED | OUTPATIENT
Start: 2025-05-26 | End: 2025-05-26

## 2025-05-26 RX ORDER — SODIUM CHLORIDE 9 MG/ML
INJECTION, SOLUTION INTRAVENOUS PRN
Status: DISCONTINUED | OUTPATIENT
Start: 2025-05-26 | End: 2025-05-26

## 2025-05-26 RX ORDER — SODIUM CHLORIDE 0.9 % (FLUSH) 0.9 %
5-40 SYRINGE (ML) INJECTION PRN
Status: DISCONTINUED | OUTPATIENT
Start: 2025-05-26 | End: 2025-05-27 | Stop reason: HOSPADM

## 2025-05-26 RX ORDER — NICOTINE 21 MG/24HR
1 PATCH, TRANSDERMAL 24 HOURS TRANSDERMAL ONCE
Status: DISCONTINUED | OUTPATIENT
Start: 2025-05-26 | End: 2025-05-27 | Stop reason: HOSPADM

## 2025-05-26 RX ORDER — LORAZEPAM 2 MG/ML
4 INJECTION INTRAMUSCULAR
Status: DISCONTINUED | OUTPATIENT
Start: 2025-05-26 | End: 2025-05-26

## 2025-05-26 RX ORDER — PHENOBARBITAL SODIUM 130 MG/ML
260 INJECTION, SOLUTION INTRAMUSCULAR; INTRAVENOUS ONCE
Status: DISCONTINUED | OUTPATIENT
Start: 2025-05-26 | End: 2025-05-26

## 2025-05-26 RX ORDER — LORAZEPAM 1 MG/1
4 TABLET ORAL
Status: DISCONTINUED | OUTPATIENT
Start: 2025-05-26 | End: 2025-05-26

## 2025-05-26 RX ORDER — LORAZEPAM 2 MG/ML
1 INJECTION INTRAMUSCULAR
Status: DISCONTINUED | OUTPATIENT
Start: 2025-05-26 | End: 2025-05-26

## 2025-05-26 RX ORDER — LORAZEPAM 1 MG/1
3 TABLET ORAL
Status: DISCONTINUED | OUTPATIENT
Start: 2025-05-26 | End: 2025-05-26

## 2025-05-26 RX ORDER — LORAZEPAM 1 MG/1
2 TABLET ORAL
Status: DISCONTINUED | OUTPATIENT
Start: 2025-05-26 | End: 2025-05-26

## 2025-05-26 RX ORDER — LORAZEPAM 2 MG/ML
3 INJECTION INTRAMUSCULAR
Status: DISCONTINUED | OUTPATIENT
Start: 2025-05-26 | End: 2025-05-26

## 2025-05-26 RX ADMIN — PHENOBARBITAL SODIUM 260 MG: 65 INJECTION INTRAMUSCULAR at 13:07

## 2025-05-26 RX ADMIN — DIAZEPAM 5 MG: 5 TABLET ORAL at 13:06

## 2025-05-26 ASSESSMENT — ENCOUNTER SYMPTOMS
ABDOMINAL PAIN: 0
SHORTNESS OF BREATH: 0

## 2025-05-26 ASSESSMENT — LIFESTYLE VARIABLES
HOW MANY STANDARD DRINKS CONTAINING ALCOHOL DO YOU HAVE ON A TYPICAL DAY: 10 OR MORE
HOW OFTEN DO YOU HAVE A DRINK CONTAINING ALCOHOL: 4 OR MORE TIMES A WEEK

## 2025-05-26 ASSESSMENT — PAIN - FUNCTIONAL ASSESSMENT: PAIN_FUNCTIONAL_ASSESSMENT: NONE - DENIES PAIN

## 2025-05-26 NOTE — ED NOTES
Patients SpO2 at 86% on room air while sleeping. Patient placed on 2L nasal cannula O2 with increase to 92%. Dr. Koch notified.

## 2025-05-26 NOTE — ED NOTES
Report given to IVONNE Jara from ED .   Report method in person   The following was reviewed with receiving RN:   Current vital signs:  /66   Pulse 84   Temp 98.1 °F (36.7 °C) (Oral)   Resp 13   Ht 1.702 m (5' 7\")   Wt 76.2 kg (168 lb)   SpO2 92%   BMI 26.31 kg/m²                      Any medication or safety alerts were reviewed. Any pending diagnostics and notifications were also reviewed, as well as any safety concerns or issues, abnormal labs, abnormal imaging, and abnormal assessment findings. Questions were answered.

## 2025-05-26 NOTE — ED NOTES
Patient hypoxic on monitor. Writer entered patients room to find patient asleep and having removed the nasal cannula from his nose. Writer replaced nasal cannula, instructed patient to leave nasal cannula in nose. Patients SpO2 remains at 88% at this time. Writer increased O2 to 3L with SpO2 increase to 90%. Dr. Koch notified.

## 2025-05-26 NOTE — ED PROVIDER NOTES
[facility]  Emergency Department Encounter  Emergency Medicine Physician     Pt Name: James Vazquez  MRN: [id number]  Birthdate 1975  Date of evaluation: 5/26/25  PCP:  No primary care provider on file.    CHIEF COMPLAINT       Chief Complaint   Patient presents with    Panic Attack    Alcohol Intoxication       HISTORY OF PRESENT ILLNESS  (Location/Symptom, Timing/Onset, Context/Setting, Quality, Duration, Modifying Factors, Severity.)    James Vazquez is a 50 y.o. male who presents with alcohol withdrawal, anxiety.  States that he has been homeless for the past few days which has been worsening his anxiety.  States that he drinks at least about a gallon of vodka a day.  States that he had 1 beer around 10 AM today.  Believes that he is going through withdrawal.  States he is gone through withdrawal many times.  States that he has not had seizures in the past related to withdrawal.  Believes that he may have had a TIA in the past.  Denies any other drug use other than occasional marijuana.  States he does feel quite anxious about being homeless and his alcohol use.  States that he would be open to going to alcohol rehab.      PAST MEDICAL / SURGICAL / SOCIAL / FAMILY HISTORY    has a past medical history of Hypertension and Overdose.     has no past surgical history on file.    No family history on file.    Allergies:    Patient has no known allergies.    Home Medications:  Prior to Admission medications    Medication Sig Start Date End Date Taking? Authorizing Provider   aspirin 81 MG chewable tablet Take 1 tablet by mouth daily  Patient not taking: Reported on 10/4/2023 9/1/20   Hill Narvaez MD   carvedilol (COREG) 6.25 MG tablet Take 1 tablet by mouth 2 times daily (with meals)  Patient not taking: Reported on 10/4/2023 8/31/20   Hill Narvaez MD   amLODIPine (NORVASC) 10 MG tablet Take 1 tablet by mouth daily  Patient not taking: Reported on 10/4/2023 9/1/20   Hill Narvaez MD

## 2025-05-26 NOTE — ED NOTES
Patient is a 50 year old male that comes to the ED on his own today for substance abuse issues. Patient reports he has been an alcoholic most of his life. Patient reports he is embarrassed and while he wants help he doesn't know how to get help.     Writer reassured patient coming to the hospital today was a positive choice. Patient reports he helped take care of his mom until she passed away a year ago. Patient reports her passing was very hard. Patient was very tearful while speaking to this writer. Patient reports that now he \"has nobody\" as most of his family has cut ties with him. Patient reports most of his family doesn't approve of him being lui and the one sister that does still talk to him he \"fed up\" because of his drinking. Patient reports he drinks about a gallon of vodka a day. Patient reports he has drank at this rate for several years. Patient reports his brief periods of sobriety were not by choice but more situational. Patient reports he has experienced withdrawal symptoms in the past but denies any seizures. Patient reports occasional marijuana use but denies any other drug use.     Patient denies suicidal and homicidal ideations.     Writer discusses possible substance abuse treatment options including [facility]. Patient is agreeable to seeking help today.

## 2025-05-27 VITALS
BODY MASS INDEX: 26.37 KG/M2 | HEIGHT: 67 IN | SYSTOLIC BLOOD PRESSURE: 171 MMHG | DIASTOLIC BLOOD PRESSURE: 86 MMHG | RESPIRATION RATE: 16 BRPM | TEMPERATURE: 98.5 F | OXYGEN SATURATION: 95 % | WEIGHT: 168 LBS | HEART RATE: 97 BPM

## 2025-05-27 NOTE — ED NOTES
Pt declined at [facility] due to concerns for pt's O2 levels.     SW contacted [facility]. Pt accepted to  for detox. Pt agreeable with this plan.

## 2025-05-27 NOTE — ED NOTES
SW arranged pt transportation through [employer].       The following service(s) is/are recommended for behavioral health follow-up:   Contact [facility] line [phone] any time for support.   Medications: Take Medications as prescribed. Let your physician know if you have any concerns.   Recovery Help line for assistance with linkage to mental health and substance use services. Call 211.   Return to Emergency Department if you have any further medical concerns.   Patient given National suicide prevention line at 1-800.856.1929.   Patient is to follow-up with providers at [facility]

## 2025-08-24 ENCOUNTER — OFFICE VISIT (OUTPATIENT)
Dept: FAMILY MEDICINE CLINIC | Age: 50
End: 2025-08-24
Payer: MEDICAID

## 2025-08-24 ENCOUNTER — HOSPITAL ENCOUNTER (OUTPATIENT)
Age: 50
Setting detail: SPECIMEN
Discharge: HOME | End: 2025-08-24

## 2025-08-24 VITALS
HEART RATE: 97 BPM | DIASTOLIC BLOOD PRESSURE: 88 MMHG | OXYGEN SATURATION: 97 % | SYSTOLIC BLOOD PRESSURE: 165 MMHG | TEMPERATURE: 97.1 F

## 2025-08-24 DIAGNOSIS — I10 PRIMARY HYPERTENSION: ICD-10-CM

## 2025-08-24 DIAGNOSIS — H60.331 ACUTE SWIMMER'S EAR OF RIGHT SIDE: ICD-10-CM

## 2025-08-24 DIAGNOSIS — B37.0 THRUSH, ORAL: ICD-10-CM

## 2025-08-24 DIAGNOSIS — L98.419 ULCER OF BUTTOCK (HCC): ICD-10-CM

## 2025-08-24 DIAGNOSIS — L03.317 CELLULITIS OF BUTTOCK: Primary | ICD-10-CM

## 2025-08-24 DIAGNOSIS — L30.9 DERMATITIS: ICD-10-CM

## 2025-08-24 DIAGNOSIS — L03.317 CELLULITIS OF BUTTOCK: ICD-10-CM

## 2025-08-24 DIAGNOSIS — I21.4 NSTEMI (NON-ST ELEVATED MYOCARDIAL INFARCTION) (HCC): ICD-10-CM

## 2025-08-24 PROCEDURE — 3079F DIAST BP 80-89 MM HG: CPT | Performed by: FAMILY MEDICINE

## 2025-08-24 PROCEDURE — 3077F SYST BP >= 140 MM HG: CPT | Performed by: FAMILY MEDICINE

## 2025-08-24 PROCEDURE — 99214 OFFICE O/P EST MOD 30 MIN: CPT | Performed by: FAMILY MEDICINE

## 2025-08-24 RX ORDER — CIPROFLOXACIN AND DEXAMETHASONE 3; 1 MG/ML; MG/ML
4 SUSPENSION/ DROPS AURICULAR (OTIC) 2 TIMES DAILY
Qty: 1 EACH | Refills: 0 | Status: SHIPPED | OUTPATIENT
Start: 2025-08-24 | End: 2025-08-31

## 2025-08-24 RX ORDER — NYSTATIN 100000 [USP'U]/ML
500000 SUSPENSION ORAL 4 TIMES DAILY
Qty: 140 ML | Refills: 0 | Status: SHIPPED | OUTPATIENT
Start: 2025-08-24 | End: 2025-08-31

## 2025-08-24 RX ORDER — PREDNISONE 20 MG/1
20 TABLET ORAL DAILY
Qty: 4 TABLET | Refills: 0 | Status: SHIPPED | OUTPATIENT
Start: 2025-08-24 | End: 2025-08-28

## 2025-08-24 RX ORDER — SULFAMETHOXAZOLE AND TRIMETHOPRIM 800; 160 MG/1; MG/1
1 TABLET ORAL 2 TIMES DAILY
Qty: 14 TABLET | Refills: 0 | Status: SHIPPED | OUTPATIENT
Start: 2025-08-24 | End: 2025-08-31

## 2025-08-24 RX ORDER — CARVEDILOL 6.25 MG/1
6.25 TABLET ORAL 2 TIMES DAILY WITH MEALS
Qty: 60 TABLET | Refills: 0 | Status: SHIPPED | OUTPATIENT
Start: 2025-08-24

## 2025-08-24 RX ORDER — AMLODIPINE BESYLATE 10 MG/1
10 TABLET ORAL DAILY
Qty: 30 TABLET | Refills: 0 | Status: SHIPPED | OUTPATIENT
Start: 2025-08-24

## 2025-08-24 RX ORDER — BETAMETHASONE VALERATE 1.2 MG/G
CREAM TOPICAL
Qty: 15 G | Refills: 0 | Status: SHIPPED | OUTPATIENT
Start: 2025-08-24 | End: 2025-09-06

## 2025-08-26 ENCOUNTER — OFFICE VISIT (OUTPATIENT)
Age: 50
End: 2025-08-26
Payer: MEDICAID

## 2025-08-26 ENCOUNTER — HOSPITAL ENCOUNTER (OUTPATIENT)
Age: 50
Setting detail: SPECIMEN
Discharge: HOME | End: 2025-08-26

## 2025-08-26 VITALS
HEIGHT: 67 IN | WEIGHT: 159 LBS | HEART RATE: 86 BPM | BODY MASS INDEX: 24.96 KG/M2 | DIASTOLIC BLOOD PRESSURE: 82 MMHG | OXYGEN SATURATION: 96 % | SYSTOLIC BLOOD PRESSURE: 126 MMHG

## 2025-08-26 DIAGNOSIS — Z00.00 WELL ADULT EXAM: ICD-10-CM

## 2025-08-26 DIAGNOSIS — J43.8 OTHER EMPHYSEMA (HCC): ICD-10-CM

## 2025-08-26 DIAGNOSIS — B37.0 THRUSH, ORAL: ICD-10-CM

## 2025-08-26 DIAGNOSIS — J44.9 OSA AND COPD OVERLAP SYNDROME (HCC): Chronic | ICD-10-CM

## 2025-08-26 DIAGNOSIS — G47.33 OSA AND COPD OVERLAP SYNDROME (HCC): Chronic | ICD-10-CM

## 2025-08-26 DIAGNOSIS — Z91.89 HISTORY OF DRUG OVERDOSE: ICD-10-CM

## 2025-08-26 DIAGNOSIS — I10 PRIMARY HYPERTENSION: ICD-10-CM

## 2025-08-26 DIAGNOSIS — L98.419 ULCER OF BUTTOCK (HCC): ICD-10-CM

## 2025-08-26 DIAGNOSIS — B95.8 STAPH INFECTION: ICD-10-CM

## 2025-08-26 DIAGNOSIS — F19.10 SUBSTANCE ABUSE (HCC): ICD-10-CM

## 2025-08-26 DIAGNOSIS — L73.9 FOLLICULITIS: Primary | ICD-10-CM

## 2025-08-26 DIAGNOSIS — Z87.09 HISTORY OF RESPIRATORY FAILURE: ICD-10-CM

## 2025-08-26 LAB
MICROORGANISM SPEC CULT: ABNORMAL
MICROORGANISM SPEC CULT: ABNORMAL
MICROORGANISM/AGENT SPEC: ABNORMAL
SPECIMEN DESCRIPTION: ABNORMAL

## 2025-08-26 PROCEDURE — 3079F DIAST BP 80-89 MM HG: CPT | Performed by: FAMILY MEDICINE

## 2025-08-26 PROCEDURE — 99215 OFFICE O/P EST HI 40 MIN: CPT | Performed by: FAMILY MEDICINE

## 2025-08-26 PROCEDURE — 3074F SYST BP LT 130 MM HG: CPT | Performed by: FAMILY MEDICINE

## 2025-08-26 RX ORDER — DIPHENHYDRAMINE HCL 25 MG
TABLET ORAL
COMMUNITY
Start: 2025-07-14

## 2025-08-26 RX ORDER — HYDROXYZINE HYDROCHLORIDE 25 MG/1
TABLET, FILM COATED ORAL
COMMUNITY
Start: 2025-07-14

## 2025-08-26 RX ORDER — DOCUSATE SODIUM 100 MG/1
CAPSULE, LIQUID FILLED ORAL
COMMUNITY
Start: 2025-07-14

## 2025-08-26 RX ORDER — FOLIC ACID 1 MG/1
TABLET ORAL
COMMUNITY
Start: 2025-07-14

## 2025-08-26 RX ORDER — CEPHALEXIN 500 MG/1
500 CAPSULE ORAL 3 TIMES DAILY
Qty: 30 CAPSULE | Refills: 0 | Status: SHIPPED | OUTPATIENT
Start: 2025-08-26 | End: 2025-09-05

## 2025-08-26 RX ORDER — POLYETHYLENE GLYCOL 3350 17 G
POWDER IN PACKET (EA) ORAL
COMMUNITY
Start: 2025-06-26

## 2025-08-26 RX ORDER — CLONIDINE HYDROCHLORIDE 0.1 MG/1
TABLET ORAL
COMMUNITY
Start: 2025-07-14

## 2025-08-26 RX ORDER — BENZONATATE 200 MG/1
CAPSULE ORAL
COMMUNITY
Start: 2025-06-26

## 2025-08-26 RX ORDER — DICYCLOMINE HCL 20 MG
TABLET ORAL
COMMUNITY
Start: 2025-07-14

## 2025-08-26 RX ORDER — CYCLOBENZAPRINE HCL 10 MG
TABLET ORAL
COMMUNITY
Start: 2025-07-14

## 2025-08-26 RX ORDER — ACETAMINOPHEN 325 MG/1
TABLET ORAL
COMMUNITY
Start: 2025-07-14

## 2025-08-26 RX ORDER — SULFAMETHOXAZOLE AND TRIMETHOPRIM 800; 160 MG/1; MG/1
1 TABLET ORAL 2 TIMES DAILY
Qty: 6 TABLET | Refills: 0 | Status: SHIPPED | OUTPATIENT
Start: 2025-08-26 | End: 2025-08-29

## 2025-08-26 RX ORDER — ONDANSETRON 4 MG/1
TABLET, FILM COATED ORAL
COMMUNITY
Start: 2025-07-14

## 2025-08-26 RX ORDER — GUAIFENESIN 600 MG/1
TABLET ORAL
COMMUNITY
Start: 2025-06-19

## 2025-08-26 RX ORDER — CARBAMAZEPINE 200 MG/1
TABLET ORAL
COMMUNITY
Start: 2025-07-14

## 2025-08-26 RX ORDER — MAGNESIUM GLUCONATE 27 MG(500)
TABLET ORAL
COMMUNITY
Start: 2025-07-14

## 2025-08-26 RX ORDER — CARBOXYMETHYLCELLULOSE SODIUM 5 MG/ML
1 SOLUTION/ DROPS OPHTHALMIC 3 TIMES DAILY PRN
COMMUNITY
Start: 2025-04-02

## 2025-08-26 RX ORDER — ACETAMINOPHEN 500 MG
500 TABLET ORAL 4 TIMES DAILY PRN
Qty: 120 TABLET | Refills: 5 | Status: SHIPPED | OUTPATIENT
Start: 2025-08-26

## 2025-08-26 RX ORDER — LORAZEPAM 1 MG/1
TABLET ORAL
COMMUNITY
Start: 2025-05-27

## 2025-08-26 RX ORDER — GAUZE BANDAGE 2" X 2"
BANDAGE TOPICAL
COMMUNITY
Start: 2025-07-14

## 2025-08-26 RX ORDER — DIAZEPAM 5 MG/1
TABLET ORAL
COMMUNITY
Start: 2025-07-14

## 2025-08-26 RX ORDER — MULTIVITAMIN WITH FOLIC ACID 400 MCG
TABLET ORAL
COMMUNITY
Start: 2025-07-14

## 2025-08-26 RX ORDER — LANOLIN ALCOHOL/MO/W.PET/CERES
CREAM (GRAM) TOPICAL
COMMUNITY
Start: 2025-06-04

## 2025-08-26 RX ORDER — MUPIROCIN 2 %
OINTMENT (GRAM) TOPICAL
Qty: 1 EACH | Refills: 1 | Status: SHIPPED | OUTPATIENT
Start: 2025-08-26 | End: 2025-09-02

## 2025-08-26 RX ORDER — TRAZODONE HYDROCHLORIDE 150 MG/1
TABLET ORAL
COMMUNITY
Start: 2025-06-04

## 2025-08-26 RX ORDER — ALBUTEROL SULFATE 90 UG/1
INHALANT RESPIRATORY (INHALATION)
COMMUNITY
Start: 2025-07-17

## 2025-08-26 RX ORDER — TRAZODONE HYDROCHLORIDE 100 MG/1
TABLET ORAL
COMMUNITY
Start: 2025-07-14

## 2025-08-26 RX ORDER — MIRTAZAPINE 7.5 MG/1
TABLET, FILM COATED ORAL
COMMUNITY
Start: 2025-06-12

## 2025-08-26 RX ORDER — IBUPROFEN 600 MG/1
TABLET, FILM COATED ORAL
COMMUNITY
Start: 2025-07-14

## 2025-08-26 SDOH — ECONOMIC STABILITY: FOOD INSECURITY: WITHIN THE PAST 12 MONTHS, THE FOOD YOU BOUGHT JUST DIDN'T LAST AND YOU DIDN'T HAVE MONEY TO GET MORE.: NEVER TRUE

## 2025-08-26 SDOH — ECONOMIC STABILITY: FOOD INSECURITY: WITHIN THE PAST 12 MONTHS, YOU WORRIED THAT YOUR FOOD WOULD RUN OUT BEFORE YOU GOT MONEY TO BUY MORE.: NEVER TRUE

## 2025-08-26 ASSESSMENT — PATIENT HEALTH QUESTIONNAIRE - PHQ9
2. FEELING DOWN, DEPRESSED OR HOPELESS: NOT AT ALL
SUM OF ALL RESPONSES TO PHQ QUESTIONS 1-9: 0
SUM OF ALL RESPONSES TO PHQ QUESTIONS 1-9: 0
1. LITTLE INTEREST OR PLEASURE IN DOING THINGS: NOT AT ALL
SUM OF ALL RESPONSES TO PHQ QUESTIONS 1-9: 0
SUM OF ALL RESPONSES TO PHQ QUESTIONS 1-9: 0

## 2025-08-28 ENCOUNTER — RESULTS FOLLOW-UP (OUTPATIENT)
Age: 50
End: 2025-08-28

## 2025-08-29 LAB
MICROORGANISM SPEC CULT: ABNORMAL
MICROORGANISM SPEC CULT: ABNORMAL
MICROORGANISM/AGENT SPEC: ABNORMAL
MICROORGANISM/AGENT SPEC: ABNORMAL
SPECIMEN DESCRIPTION: ABNORMAL

## 2025-09-03 ENCOUNTER — HOSPITAL ENCOUNTER (OUTPATIENT)
Dept: WOUND CARE | Age: 50
Discharge: HOME | End: 2025-09-03
Payer: MEDICAID

## 2025-09-03 VITALS
WEIGHT: 175 LBS | DIASTOLIC BLOOD PRESSURE: 104 MMHG | BODY MASS INDEX: 27.47 KG/M2 | SYSTOLIC BLOOD PRESSURE: 138 MMHG | RESPIRATION RATE: 18 BRPM | HEART RATE: 113 BPM | HEIGHT: 67 IN | TEMPERATURE: 97.4 F

## 2025-09-03 DIAGNOSIS — L30.9: Primary | ICD-10-CM

## 2025-09-03 PROCEDURE — 99202 OFFICE O/P NEW SF 15 MIN: CPT | Performed by: NURSE PRACTITIONER

## 2025-09-03 PROCEDURE — 99212 OFFICE O/P EST SF 10 MIN: CPT

## 2025-09-03 ASSESSMENT — ENCOUNTER SYMPTOMS
RHINORRHEA: 0
VOMITING: 0
NAUSEA: 0
SHORTNESS OF BREATH: 0
COUGH: 0
DIARRHEA: 0

## 2025-09-03 ASSESSMENT — PAIN SCALES - GENERAL: PAINLEVEL_OUTOF10: 0
